# Patient Record
Sex: MALE | Employment: FULL TIME | URBAN - METROPOLITAN AREA
[De-identification: names, ages, dates, MRNs, and addresses within clinical notes are randomized per-mention and may not be internally consistent; named-entity substitution may affect disease eponyms.]

---

## 2018-04-23 ENCOUNTER — HOSPITAL ENCOUNTER (OUTPATIENT)
Dept: NON INVASIVE DIAGNOSTICS | Age: 58
Discharge: HOME | End: 2018-04-23
Attending: FAMILY MEDICINE
Payer: COMMERCIAL

## 2018-04-23 DIAGNOSIS — R42 DIZZINESS: ICD-10-CM

## 2018-04-23 DIAGNOSIS — R07.89 TIGHTNESS IN CHEST: ICD-10-CM

## 2018-04-23 DIAGNOSIS — I10 HYPERTENSION, ESSENTIAL: ICD-10-CM

## 2018-04-23 DIAGNOSIS — E78.5 HYPERLIPEMIA: ICD-10-CM

## 2018-04-23 LAB
ATTENDING PHYSICIAN, CST07: NORMAL
DIAGNOSIS, 93000: NORMAL
DUKE TM SCORE RESULT, CST14: NORMAL
DUKE TREADMILL SCORE, CST13: NORMAL
ECG INTERP BEFORE EX, CST11: NORMAL
ECG INTERP DURING EX, CST12: NORMAL
FUNCTIONAL CAPACITY, CST17: NORMAL
KNOWN CARDIAC CONDITION, CST08: NORMAL
MAX. DIASTOLIC BP, CST04: 58 MMHG
MAX. HEART RATE, CST05: 146 BPM
MAX. SYSTOLIC BP, CST03: 170 MMHG
OVERALL BP RESPONSE TO EXERCISE, CST16: NORMAL
OVERALL HR RESPONSE TO EXERCISE, CST15: NORMAL
PEAK EX METS, CST10: 8.2 METS
PROTOCOL NAME, CST01: NORMAL
TEST INDICATION, CST09: NORMAL

## 2018-04-23 PROCEDURE — 93017 CV STRESS TEST TRACING ONLY: CPT

## 2018-04-24 NOTE — PROCEDURES
[address]  MR#: [id number]  : 1960  ACCOUNT #: [de-identified]   DATE OF SERVICE: 2018    ORDERING PHYSICIAN:  Earnestine Rojas MD    INTERPRETING PHYSICIAN:  Dipak Marquez MD     INDICATIONS FOR STUDY:  Chest pain. BASELINE DATA:  Baseline EKG reveals normal sinus rhythm with a heart rate of 79 beats per minute. Resting blood pressure was 128/82 mmHg. Following this, patient exercised on treadmill using standard Gavino protocol. The patient exercised for a total of 6 minutes 49 seconds, achieving a work level of 8.20 METs. Heart rate increased to a maximum of 146 beats per minute, which represents 90% of maximal age predicted heart rate. Maximum blood pressure was 170/58 mmHg. Test was stopped due to fatigue. INDICATIONS:  Ischemic ST-T changes were noted. No ventricular arrhythmias were noted. The patient did not report chest pain. INTERPRETATION:  1. Negative EKG portion for ischemia. 2.  Good functional capacity. 3.  Appropriate heart rate and blood pressure response.     Thank you for this referral.      MD Tyra Ellis / [facility]  D: 2018 08:35     T: 2018 12:02  JOB #: [id number]

## 2021-02-05 ENCOUNTER — APPOINTMENT (OUTPATIENT)
Dept: GENERAL RADIOLOGY | Age: 61
End: 2021-02-05
Attending: PHYSICIAN ASSISTANT
Payer: COMMERCIAL

## 2021-02-05 ENCOUNTER — HOSPITAL ENCOUNTER (EMERGENCY)
Age: 61
Discharge: HOME | End: 2021-02-05
Attending: EMERGENCY MEDICINE
Payer: COMMERCIAL

## 2021-02-05 VITALS
OXYGEN SATURATION: 99 % | BODY MASS INDEX: 29.35 KG/M2 | DIASTOLIC BLOOD PRESSURE: 92 MMHG | HEIGHT: 67 IN | TEMPERATURE: 98.1 F | RESPIRATION RATE: 17 BRPM | SYSTOLIC BLOOD PRESSURE: 162 MMHG | WEIGHT: 187 LBS | HEART RATE: 82 BPM

## 2021-02-05 DIAGNOSIS — L03.113 CELLULITIS OF RIGHT UPPER EXTREMITY: ICD-10-CM

## 2021-02-05 DIAGNOSIS — L02.91 ABSCESS: Primary | ICD-10-CM

## 2021-02-05 PROCEDURE — 75810000289 HC I&D ABSCESS SIMP/COMP/MULT

## 2021-02-05 PROCEDURE — 99283 EMERGENCY DEPT VISIT LOW MDM: CPT

## 2021-02-05 PROCEDURE — 73130 X-RAY EXAM OF HAND: CPT

## 2021-02-05 PROCEDURE — 74011250637 HC RX REV CODE- 250/637: Performed by: PHYSICIAN ASSISTANT

## 2021-02-05 RX ORDER — CLINDAMYCIN HYDROCHLORIDE 150 MG/1
300 CAPSULE ORAL
Status: COMPLETED | OUTPATIENT
Start: 2021-02-05 | End: 2021-02-05

## 2021-02-05 RX ORDER — OXYCODONE AND ACETAMINOPHEN 5; 325 MG/1; MG/1
1 TABLET ORAL
Qty: 12 TAB | Refills: 0 | Status: SHIPPED | OUTPATIENT
Start: 2021-02-05 | End: 2021-02-08

## 2021-02-05 RX ORDER — CLINDAMYCIN HYDROCHLORIDE 300 MG/1
300 CAPSULE ORAL 4 TIMES DAILY
Qty: 28 CAP | Refills: 0 | Status: SHIPPED | OUTPATIENT
Start: 2021-02-05 | End: 2021-02-12

## 2021-02-05 RX ORDER — OXYCODONE AND ACETAMINOPHEN 5; 325 MG/1; MG/1
1 TABLET ORAL
Status: COMPLETED | OUTPATIENT
Start: 2021-02-05 | End: 2021-02-05

## 2021-02-05 RX ADMIN — CLINDAMYCIN HYDROCHLORIDE 300 MG: 150 CAPSULE ORAL at 20:15

## 2021-02-05 RX ADMIN — OXYCODONE AND ACETAMINOPHEN 1 TABLET: 5; 325 TABLET ORAL at 20:59

## 2021-02-05 NOTE — ED TRIAGE NOTES
Patient presents to ED with c/o right wrist abscess that appears to look like cellulitis x2days. Patient shares that the pain is described as tight and sharp. No drainage. VSS.

## 2021-02-06 NOTE — DISCHARGE INSTRUCTIONS
[facility] Activation    Thank you for requesting access to [facility]. Please follow the instructions below to securely access and download your online medical record. [facility] [employer] allows you to send messages to your doctor, view your test results, renew your prescriptions, schedule appointments, and more. How Do I Sign Up? In your internet browser, go to [email or web address]  Click on the First Time User? Click Here link in the Sign In box. You will be redirect to the New Member Sign Up page. Enter your [facility] Access Code exactly as it appears below. You will not need to use this code after youve completed the sign-up process. If you do not sign up before the expiration date, you must request a new code. [facility] Access Code: [unfilled] (This is the date your [facility] access code will )    Enter the last four digits of your Social Security Number (xxxx) and Date of Birth (mm/dd/yyyy) as indicated and click Submit. You will be taken to the next sign-up page. Create a [facility] ID. This will be your [facility] login ID and cannot be changed, so think of one that is secure and easy to remember. Create a [facility] [employer] password. You can change your password at any time. Enter your Password Reset Question and Answer. This can be used at a later time if you forget your password. Enter your e-mail address. You will receive e-mail notification when new information is available in [address] Click Sign Up. You can now view and download portions of your medical record. Click the [place] link to download a portable copy of your medical information. Additional Information    If you have questions, please visit the Frequently Asked Questions section of the [employer] website at [email or web address] Remember, [facility] is NOT to be used for urgent needs. For medical emergencies, dial 911.

## 2021-02-06 NOTE — ED PROVIDER NOTES
EMERGENCY DEPARTMENT HISTORY AND PHYSICAL EXAM    Date: 2/5/2021  Patient Name: Timothy Castro    History of Presenting Illness     Chief Complaint   Patient presents with    Skin Problem    Skin Infection         History Provided By: patient     Chief Complaint: abscess  Duration: 2 days  Timing: acute  Location: R hand   Quality:throbbing   Severity: moderate  Modifying Factors: none   Associated Symptoms: redness/wound      Additional History (Context): Timothy Castro is a 56 y.o. male with PMH htn who presents with c/o an abscess to the R hand that started a small \"pimple\" yesterday and increasing in size and redness significantly today. Pt reports a hx of RLE cellulitis requiring hospitalization, and expresses concerns regarding this skin infection. He denies fever, chills, and drainage from the site. Pt is right handed. No other complaints reported at this time. PCP: Alen Rivers MD    Current Facility-Administered Medications   Medication Dose Route Frequency Provider Last Rate Last Admin    oxyCODONE-acetaminophen (PERCOCET) 5-325 mg per tablet 1 Tab  1 Tab Oral NOW Felicitas Toussaint PA-C         Current Outpatient Medications   Medication Sig Dispense Refill    oxyCODONE-acetaminophen (Percocet) 5-325 mg per tablet Take 1 Tab by mouth every six (6) hours as needed for Pain for up to 3 days. Max Daily Amount: 4 Tabs. 12 Tab 0    clindamycin (CLEOCIN) 300 mg capsule Take 1 Cap by mouth four (4) times daily for 7 days. 28 Cap 0    omega 3-dha-epa-fish oil (Fish Oil) 100-160-1,000 mg cap Take  by mouth.  aspirin delayed-release 81 mg tablet Take  by mouth daily.  omeprazole (PRILOSEC) 20 mg capsule Take  by mouth.  atorvastatin (LIPITOR) 40 mg tablet Take  by mouth.  losartan (COZAAR) 100 mg tablet Take 100 mg by mouth daily.  amLODIPine (NORVASC) 5 mg tablet Take 5 mg by mouth daily.            Past History     Past Medical History:  Past Medical History: Diagnosis Date    Hypertension        Past Surgical History:  No past surgical history on file. Family History:  No family history on file. Social History:  Social History     Tobacco Use    Smoking status: Never Smoker    Smokeless tobacco: Never Used   Substance Use Topics    Alcohol use: Yes     Comment: socially    Drug use: No       Allergies:  No Known Allergies      Review of Systems   Review of Systems   Constitutional: Negative. Negative for chills and fever. HENT: Negative. Negative for congestion, ear pain and rhinorrhea. Eyes: Negative. Negative for pain and redness. Respiratory: Negative. Negative for cough, shortness of breath, wheezing and stridor. Cardiovascular: Negative. Negative for chest pain and leg swelling. Gastrointestinal: Negative. Negative for abdominal pain, constipation, diarrhea, nausea and vomiting. Genitourinary: Negative. Negative for dysuria and frequency. Musculoskeletal: Negative. Negative for back pain and neck pain. Skin: Positive for color change and wound. Neurological: Negative. Negative for dizziness, seizures, syncope and headaches. All other systems reviewed and are negative. All Other Systems Negative  Physical Exam     Vitals:    02/05/21 1846   BP: (!) 162/92   Pulse: 82   Resp: 17   Temp: 98.1 °F (36.7 °C)   SpO2: 99%   Weight: 84.8 kg (187 lb)   Height: 5' 7\" (1.702 m)     Physical Exam  Vitals signs and nursing note reviewed. Constitutional:       General: He is not in acute distress. Appearance: He is well-developed. He is not diaphoretic. HENT:      Head: Normocephalic and atraumatic. Eyes:      General: No scleral icterus. Right eye: No discharge. Left eye: No discharge. Conjunctiva/sclera: Conjunctivae normal.   Neck:      Musculoskeletal: Normal range of motion and neck supple. Cardiovascular:      Rate and Rhythm: Normal rate.    Pulmonary:      Effort: Pulmonary effort is normal. No respiratory distress. Breath sounds: No stridor. Musculoskeletal: Normal range of motion. Comments: RUE: intact pulses, cap RF < 3 sec, no bony TTP or deformity noted. FROM of all joints intact. Skin:     General: Skin is warm and dry. Comments: 3 cm indurated abscess to the dorsum of the R hand, over the area of the 5th metatarsal. No palpable fluctuance noted on exam.    Neurological:      Mental Status: He is alert and oriented to person, place, and time. Coordination: Coordination normal.      Comments: Gait is steady and patient exhibits no evidence of ataxia. Patient is able to ambulate without difficulty. No focal neurological deficit noted. No facial droop, slurred speech, or evidence of altered mentation noted on exam.       Psychiatric:         Behavior: Behavior normal.         Thought Content: Thought content normal.                Diagnostic Study Results     Labs -   No results found for this or any previous visit (from the past 12 hour(s)). Radiologic Studies -   XR HAND RT MIN 3 V    (Results Pending)     CT Results  (Last 48 hours)    None        CXR Results  (Last 48 hours)    None            Medical Decision Making   I am the first provider for this patient. I reviewed the vital signs, available nursing notes, past medical history, past surgical history, family history and social history. Vital Signs-Reviewed the patient's vital signs.       Records Reviewed: Felicitas Toussaint PA-C     Procedures:  I&D Abcess Simple    Date/Time: 2/5/2021 9:06 PM  Performed by: Yeimi Barillas  Authorized by: Yeimi Barillas     Consent:     Consent obtained:  Verbal    Consent given by:  Patient    Risks discussed:  Bleeding, incomplete drainage, infection and pain    Alternatives discussed:  No treatment  Location:     Type:  Abscess    Size:  3 cm     Location:  Upper extremity    Upper extremity location:  Hand    Hand location:  R hand  Pre-procedure details: Skin preparation:  Betadine  Procedure type:     Complexity:  Simple  Procedure details:     Needle aspiration: no      Incision types:  Single straight    Incision depth:  Dermal    Scalpel blade:  11    Wound management:  Irrigated with saline    Drainage:  Purulent and bloody    Drainage amount:  Scant    Wound treatment:  Wound left open    Packing materials:  None  Post-procedure details:     Patient tolerance of procedure: Tolerated well, no immediate complications        Provider Notes (Medical Decision Making): Impression:  Abscess. Cellulitis    X-rays negative for evidence of OM   Clindamycin given orally in the ED    Bedside US performed shows some cobblestone appearance as well as a small drainable fluid collected, discussed the options of I and D now vs waiting, pt requesting I and D now. I and D performed, small amount of blood and purulence noted. Will plan to d/c with abx and pain control with pcp follow-up on Monday for wound re-evaluation. Return precautions given. Pt agrees with this plan. Felicitas Toussaint PA-C         MED RECONCILIATION:  Current Facility-Administered Medications   Medication Dose Route Frequency    oxyCODONE-acetaminophen (PERCOCET) 5-325 mg per tablet 1 Tab  1 Tab Oral NOW     Current Outpatient Medications   Medication Sig    oxyCODONE-acetaminophen (Percocet) 5-325 mg per tablet Take 1 Tab by mouth every six (6) hours as needed for Pain for up to 3 days. Max Daily Amount: 4 Tabs.  clindamycin (CLEOCIN) 300 mg capsule Take 1 Cap by mouth four (4) times daily for 7 days.  omega 3-dha-epa-fish oil (Fish Oil) 100-160-1,000 mg cap Take  by mouth.  aspirin delayed-release 81 mg tablet Take  by mouth daily.  omeprazole (PRILOSEC) 20 mg capsule Take  by mouth.  atorvastatin (LIPITOR) 40 mg tablet Take  by mouth.  losartan (COZAAR) 100 mg tablet Take 100 mg by mouth daily.  amLODIPine (NORVASC) 5 mg tablet Take 5 mg by mouth daily. Disposition:  D/c    DISCHARGE NOTE:   Patient is stable for discharge at this time. I have discussed all the findings from today's work up with the patient, including lab results and imaging. I have answered all questions. Rx for clindamycin and percocet given. Rest and close follow-up with the PCP recommended this week. Return to the ED immediately for any new or worsening symptoms. Felicitas Rosas PA-C     Follow-up Information     Follow up With Specialties Details Why Contact Info    Alen Rivers MD Family Medicine In 2 days For suture removal [address] [place] [address] [phone] [id number] [facility] EMERGENCY DEPT Emergency Medicine  As needed, If symptoms worsen [address] [facility] [address] [phone]          Current Discharge Medication List      START taking these medications    Details   oxyCODONE-acetaminophen (Percocet) 5-325 mg per tablet Take 1 Tab by mouth every six (6) hours as needed for Pain for up to 3 days. Max Daily Amount: 4 Tabs. Qty: 12 Tab, Refills: 0    Associated Diagnoses: Abscess; Cellulitis of right upper extremity      clindamycin (CLEOCIN) 300 mg capsule Take 1 Cap by mouth four (4) times daily for 7 days. Qty: 28 Cap, Refills: 0                 Diagnosis     Clinical Impression:   1. Abscess    2.  Cellulitis of right upper extremity

## 2021-02-06 NOTE — ED NOTES
Discharge paperwork given to patient by provider and teaching completed. Did no see nor assess patient. Medicated and patient waiting for uber for ride home.  Stable at dispo

## 2021-04-08 ENCOUNTER — HOSPITAL ENCOUNTER (EMERGENCY)
Age: 61
Discharge: HOME | DRG: 853 | End: 2021-04-08
Attending: EMERGENCY MEDICINE
Payer: COMMERCIAL

## 2021-04-08 ENCOUNTER — APPOINTMENT (OUTPATIENT)
Dept: CT IMAGING | Age: 61
DRG: 853 | End: 2021-04-08
Attending: PHYSICIAN ASSISTANT
Payer: COMMERCIAL

## 2021-04-08 VITALS
SYSTOLIC BLOOD PRESSURE: 158 MMHG | TEMPERATURE: 98.8 F | DIASTOLIC BLOOD PRESSURE: 95 MMHG | BODY MASS INDEX: 29.82 KG/M2 | OXYGEN SATURATION: 97 % | WEIGHT: 190 LBS | RESPIRATION RATE: 18 BRPM | HEIGHT: 67 IN | HEART RATE: 97 BPM

## 2021-04-08 DIAGNOSIS — L03.221 CELLULITIS OF NECK: Primary | ICD-10-CM

## 2021-04-08 LAB
ALBUMIN SERPL-MCNC: 3.4 G/DL (ref 3.4–5)
ALBUMIN/GLOB SERPL: 0.8 {RATIO} (ref 0.8–1.7)
ALP SERPL-CCNC: 76 U/L (ref 45–117)
ALT SERPL-CCNC: 35 U/L (ref 16–61)
ANION GAP SERPL CALC-SCNC: 5 MMOL/L (ref 3–18)
AST SERPL-CCNC: 16 U/L (ref 10–38)
BASOPHILS # BLD: 0 K/UL (ref 0–0.1)
BASOPHILS NFR BLD: 0 % (ref 0–2)
BILIRUB SERPL-MCNC: 0.6 MG/DL (ref 0.2–1)
BUN SERPL-MCNC: 21 MG/DL (ref 7–18)
BUN/CREAT SERPL: 19 (ref 12–20)
CALCIUM SERPL-MCNC: 8.7 MG/DL (ref 8.5–10.1)
CHLORIDE SERPL-SCNC: 106 MMOL/L (ref 100–111)
CO2 SERPL-SCNC: 28 MMOL/L (ref 21–32)
CREAT SERPL-MCNC: 1.11 MG/DL (ref 0.6–1.3)
DIFFERENTIAL METHOD BLD: ABNORMAL
EOSINOPHIL # BLD: 0 K/UL (ref 0–0.4)
EOSINOPHIL NFR BLD: 0 % (ref 0–5)
ERYTHROCYTE [DISTWIDTH] IN BLOOD BY AUTOMATED COUNT: 13.8 % (ref 11.6–14.5)
GLOBULIN SER CALC-MCNC: 4.3 G/DL (ref 2–4)
GLUCOSE SERPL-MCNC: 121 MG/DL (ref 74–99)
HCT VFR BLD AUTO: 44.1 % (ref 36–48)
HGB BLD-MCNC: 15.1 G/DL (ref 13–16)
LACTATE BLD-SCNC: 0.51 MMOL/L (ref 0.4–2)
LYMPHOCYTES # BLD: 1.5 K/UL (ref 0.9–3.6)
LYMPHOCYTES NFR BLD: 8 % (ref 21–52)
MCH RBC QN AUTO: 30.4 PG (ref 24–34)
MCHC RBC AUTO-ENTMCNC: 34.2 G/DL (ref 31–37)
MCV RBC AUTO: 88.7 FL (ref 74–97)
MONOCYTES # BLD: 0.6 K/UL (ref 0.05–1.2)
MONOCYTES NFR BLD: 3 % (ref 3–10)
NEUTS SEG # BLD: 16.6 K/UL (ref 1.8–8)
NEUTS SEG NFR BLD: 89 % (ref 40–73)
PLATELET # BLD AUTO: 206 K/UL (ref 135–420)
PLATELET COMMENTS,PCOM: ABNORMAL
PMV BLD AUTO: 10.2 FL (ref 9.2–11.8)
POTASSIUM SERPL-SCNC: 3.1 MMOL/L (ref 3.5–5.5)
PROT SERPL-MCNC: 7.7 G/DL (ref 6.4–8.2)
RBC # BLD AUTO: 4.97 M/UL (ref 4.35–5.65)
RBC MORPH BLD: ABNORMAL
SODIUM SERPL-SCNC: 139 MMOL/L (ref 136–145)
WBC # BLD AUTO: 18.7 K/UL (ref 4.6–13.2)

## 2021-04-08 PROCEDURE — 80053 COMPREHEN METABOLIC PANEL: CPT

## 2021-04-08 PROCEDURE — 99283 EMERGENCY DEPT VISIT LOW MDM: CPT

## 2021-04-08 PROCEDURE — 74011000250 HC RX REV CODE- 250: Performed by: PHYSICIAN ASSISTANT

## 2021-04-08 PROCEDURE — 85025 COMPLETE CBC W/AUTO DIFF WBC: CPT

## 2021-04-08 PROCEDURE — 70491 CT SOFT TISSUE NECK W/DYE: CPT

## 2021-04-08 PROCEDURE — 83605 ASSAY OF LACTIC ACID: CPT

## 2021-04-08 PROCEDURE — 74011000258 HC RX REV CODE- 258: Performed by: PHYSICIAN ASSISTANT

## 2021-04-08 PROCEDURE — 96374 THER/PROPH/DIAG INJ IV PUSH: CPT

## 2021-04-08 PROCEDURE — 74011000636 HC RX REV CODE- 636: Performed by: EMERGENCY MEDICINE

## 2021-04-08 PROCEDURE — 74011250637 HC RX REV CODE- 250/637: Performed by: PHYSICIAN ASSISTANT

## 2021-04-08 RX ORDER — HYDROCODONE BITARTRATE AND ACETAMINOPHEN 5; 325 MG/1; MG/1
1 TABLET ORAL
Qty: 14 TAB | Refills: 0 | Status: SHIPPED | OUTPATIENT
Start: 2021-04-08 | End: 2021-04-16

## 2021-04-08 RX ORDER — CLINDAMYCIN HYDROCHLORIDE 300 MG/1
300 CAPSULE ORAL 4 TIMES DAILY
Qty: 28 CAP | Refills: 0 | Status: SHIPPED | OUTPATIENT
Start: 2021-04-08 | End: 2021-04-16

## 2021-04-08 RX ORDER — POTASSIUM CHLORIDE 20 MEQ/1
40 TABLET, EXTENDED RELEASE ORAL
Status: COMPLETED | OUTPATIENT
Start: 2021-04-08 | End: 2021-04-08

## 2021-04-08 RX ORDER — HYDROCODONE BITARTRATE AND ACETAMINOPHEN 5; 325 MG/1; MG/1
1 TABLET ORAL
Status: COMPLETED | OUTPATIENT
Start: 2021-04-08 | End: 2021-04-08

## 2021-04-08 RX ADMIN — POTASSIUM CHLORIDE 40 MEQ: 1500 TABLET, EXTENDED RELEASE ORAL at 23:02

## 2021-04-08 RX ADMIN — CLINDAMYCIN PHOSPHATE 600 MG: 150 INJECTION, SOLUTION INTRAVENOUS at 23:02

## 2021-04-08 RX ADMIN — IOPAMIDOL 80 ML: 612 INJECTION, SOLUTION INTRAVENOUS at 21:45

## 2021-04-08 RX ADMIN — HYDROCODONE BITARTRATE AND ACETAMINOPHEN 1 TABLET: 5; 325 TABLET ORAL at 23:02

## 2021-04-08 NOTE — ED TRIAGE NOTES
Patient presents to the ED with complaints of neck pain x 1 week. Patient states he has some knots on the back of his neck. Patient states he has a history of cellulitis.

## 2021-04-09 NOTE — DISCHARGE INSTRUCTIONS
[employer] Activation    Thank you for requesting access to [employer]. Please follow the instructions below to securely access and download your online medical record. [employer] allows you to send messages to your doctor, view your test results, renew your prescriptions, schedule appointments, and more. How Do I Sign Up? In your internet browser, go to [email or web address]  Click on the First Time User? Click Here link in the Sign In box. You will be redirect to the New Member Sign Up page. Enter your [employer] Access Code exactly as it appears below. You will not need to use this code after youve completed the sign-up process. If you do not sign up before the expiration date, you must request a new code. [employer] Access Code: [unfilled] (This is the date your [employer] access code will )    Enter the last four digits of your Social Security Number (xxxx) and Date of Birth (mm/dd/yyyy) as indicated and click Submit. You will be taken to the next sign-up page. Create a [employer] ID. This will be your [employer] login ID and cannot be changed, so think of one that is secure and easy to remember. Create a [employer] password. You can change your password at any time. Enter your Password Reset Question and Answer. This can be used at a later time if you forget your password. Enter your e-mail address. You will receive e-mail notification when new information is available in [address] Click Sign Up. You can now view and download portions of your medical record. Click the [place] link to download a portable copy of your medical information. Additional Information    If you have questions, please visit the Frequently Asked Questions section of the [employer] website at [email or web address] Remember, [employer] is NOT to be used for urgent needs. For medical emergencies, dial 911.

## 2021-04-09 NOTE — ED PROVIDER NOTES
EMERGENCY DEPARTMENT HISTORY AND PHYSICAL EXAM    Date: 4/8/2021  Patient Name: Richard Sharif    History of Presenting Illness     Chief Complaint   Patient presents with    Neck Pain         History Provided By: Patient    Chief Complaint: neck pain   Duration: 2 Days  Timing:  Gradual  Location: back of neck   Quality: Stabbing and Pressure  Severity: Moderate  Modifying Factors: none   Associated Symptoms: denies any other associated signs or symptoms      Additional History (Context): Richard Sharif is a 56 y.o. male with PMH of cellulitis and HTN who presents with pain and possible abscess on the back of his neck. Patient states that he noticed a lesion on the back of his neck 3 days ago and ever since he has been experiencing worsening pain, warmth, and has noticed redness over the area. He still has FROM of his neck. He denies fever and chills. No issues on the front of his neck. Stats that he has had cellulitis 3x in the past and he is concerned for a possible infection. No nausea, vomiting, or recent illness. PCP: Rosalie Coello MD    Current Facility-Administered Medications   Medication Dose Route Frequency Provider Last Rate Last Admin    clindamycin phosphate (CLEOCIN) 600 mg in 0.9% sodium chloride (MBP/ADV) 100 mL MBP  600 mg IntraVENous NOW Felicitas Toussaint PA-C   600 mg at 04/08/21 2302     Current Outpatient Medications   Medication Sig Dispense Refill    clindamycin (CLEOCIN) 300 mg capsule Take 1 Cap by mouth four (4) times daily for 7 days. 28 Cap 0    HYDROcodone-acetaminophen (NORCO) 5-325 mg per tablet Take 1 Tab by mouth every six (6) hours as needed for Pain for up to 7 days. Max Daily Amount: 4 Tabs. 14 Tab 0    omega 3-dha-epa-fish oil (Fish Oil) 100-160-1,000 mg cap Take  by mouth.  aspirin delayed-release 81 mg tablet Take  by mouth daily.  omeprazole (PRILOSEC) 20 mg capsule Take  by mouth.  atorvastatin (LIPITOR) 40 mg tablet Take  by mouth.  losartan (COZAAR) 100 mg tablet Take 100 mg by mouth daily.  amLODIPine (NORVASC) 5 mg tablet Take 5 mg by mouth daily. Past History     Past Medical History:  Past Medical History:   Diagnosis Date    Hypertension        Past Surgical History:  No past surgical history on file. Family History:  No family history on file. Social History:  Social History     Tobacco Use    Smoking status: Never Smoker    Smokeless tobacco: Never Used   Substance Use Topics    Alcohol use: Yes     Comment: socially    Drug use: No       Allergies:  No Known Allergies      Review of Systems   Review of Systems   Constitutional: Negative for chills and fever. HENT: Negative for congestion, sneezing and sore throat. Respiratory: Negative for cough and shortness of breath. Cardiovascular: Negative for chest pain. Gastrointestinal: Negative for abdominal pain, diarrhea, nausea and vomiting. Genitourinary: Negative for dysuria, frequency and urgency. Musculoskeletal: Positive for neck pain. Negative for arthralgias, back pain and neck stiffness. Skin: Positive for rash. Neurological: Negative for dizziness, light-headedness and headaches. Psychiatric/Behavioral: Negative. All other systems reviewed and are negative. All Other Systems Negative  Physical Exam     Vitals:    04/08/21 1936   BP: (!) 158/95   Pulse: 97   Resp: 18   Temp: 98.8 °F (37.1 °C)   SpO2: 97%   Weight: 86.2 kg (190 lb)   Height: 5' 7\" (1.702 m)     Physical Exam  Vitals signs and nursing note reviewed. Constitutional:       Appearance: Normal appearance. He is normal weight. HENT:      Head: Normocephalic and atraumatic. Nose: Nose normal. No rhinorrhea. Mouth/Throat:      Mouth: Mucous membranes are moist.      Pharynx: Oropharynx is clear. Eyes:      Extraocular Movements: Extraocular movements intact.       Conjunctiva/sclera: Conjunctivae normal.      Pupils: Pupils are equal, round, and reactive to light. Neck:      Musculoskeletal: Normal range of motion. Muscular tenderness (area of tenderness over the back neck ) present. No neck rigidity. Cardiovascular:      Rate and Rhythm: Normal rate and regular rhythm. Pulses: Normal pulses. Heart sounds: Normal heart sounds. No murmur. No friction rub. No gallop. Pulmonary:      Effort: Pulmonary effort is normal.      Breath sounds: Normal breath sounds. Abdominal:      General: Abdomen is flat. Palpations: Abdomen is soft. Musculoskeletal: Normal range of motion. General: No swelling or tenderness. Skin:     General: Skin is warm and dry. Findings: Erythema and rash (6x3cm indurated area of erythema and warmth over back of neck with 2 possible central pores w/ small amount of purulence noted. ) present. Neurological:      General: No focal deficit present. Mental Status: He is alert and oriented to person, place, and time. Mental status is at baseline. Comments: Gait is steady and patient exhibits no evidence of ataxia. Patient is able to ambulate without difficulty. No focal neurological deficit noted.  No facial droop, slurred speech, or evidence of altered mentation noted on exam.     Psychiatric:         Mood and Affect: Mood normal.         Behavior: Behavior normal.         Judgment: Judgment normal.                Diagnostic Study Results     Labs -     Recent Results (from the past 12 hour(s))   CBC WITH AUTOMATED DIFF    Collection Time: 04/08/21  7:44 PM   Result Value Ref Range    WBC 18.7 (H) 4.6 - 13.2 K/uL    RBC 4.97 4.35 - 5.65 M/uL    HGB 15.1 13.0 - 16.0 g/dL    HCT 44.1 36.0 - 48.0 %    MCV 88.7 74.0 - 97.0 FL    MCH 30.4 24.0 - 34.0 PG    MCHC 34.2 31.0 - 37.0 g/dL    RDW 13.8 11.6 - 14.5 %    PLATELET 343 461 - 240 K/uL    MPV 10.2 9.2 - 11.8 FL    NEUTROPHILS 89 (H) 40 - 73 %    LYMPHOCYTES 8 (L) 21 - 52 %    MONOCYTES 3 3 - 10 %    EOSINOPHILS 0 0 - 5 %    BASOPHILS 0 0 - 2 % ABS. NEUTROPHILS 16.6 (H) 1.8 - 8.0 K/UL    ABS. LYMPHOCYTES 1.5 0.9 - 3.6 K/UL    ABS. MONOCYTES 0.6 0.05 - 1.2 K/UL    ABS. EOSINOPHILS 0.0 0.0 - 0.4 K/UL    ABS. BASOPHILS 0.0 0.0 - 0.1 K/UL    DF MANUAL      PLATELET COMMENTS ADEQUATE PLATELETS      RBC COMMENTS NORMOCYTIC, NORMOCHROMIC     METABOLIC PANEL, COMPREHENSIVE    Collection Time: 04/08/21  7:44 PM   Result Value Ref Range    Sodium 139 136 - 145 mmol/L    Potassium 3.1 (L) 3.5 - 5.5 mmol/L    Chloride 106 100 - 111 mmol/L    CO2 28 21 - 32 mmol/L    Anion gap 5 3.0 - 18 mmol/L    Glucose 121 (H) 74 - 99 mg/dL    BUN 21 (H) 7.0 - 18 MG/DL    Creatinine 1.11 0.6 - 1.3 MG/DL    BUN/Creatinine ratio 19 12 - 20      GFR est AA >60 >60 ml/min/1.73m2    GFR est non-AA >60 >60 ml/min/1.73m2    Calcium 8.7 8.5 - 10.1 MG/DL    Bilirubin, total 0.6 0.2 - 1.0 MG/DL    ALT (SGPT) 35 16 - 61 U/L    AST (SGOT) 16 10 - 38 U/L    Alk. phosphatase 76 45 - 117 U/L    Protein, total 7.7 6.4 - 8.2 g/dL    Albumin 3.4 3.4 - 5.0 g/dL    Globulin 4.3 (H) 2.0 - 4.0 g/dL    A-G Ratio 0.8 0.8 - 1.7     POC LACTIC ACID    Collection Time: 04/08/21  7:52 PM   Result Value Ref Range    Lactic Acid (POC) 0.51 0.40 - 2.00 mmol/L       Radiologic Studies -   CT NECK SOFT TISSUE W CONT   Final Result   1. Soft tissue swelling throughout the cutaneous and subcutaneous posterior neck   may represent cellulitis, without abscess. 2. Left mandibular first molar tooth dental and periodontal disease. 3. Borderline mediastinal AP window lymphadenopathy. Consider complete CT chest   follow-up in 3 months for stability. CT Results  (Last 48 hours)               04/08/21 2151  CT NECK SOFT TISSUE W CONT Final result    Impression:  1. Soft tissue swelling throughout the cutaneous and subcutaneous posterior neck   may represent cellulitis, without abscess. 2. Left mandibular first molar tooth dental and periodontal disease. 3. Borderline mediastinal AP window lymphadenopathy. Consider complete CT chest   follow-up in 3 months for stability. Narrative:  Neck CT With Contrast       CLINICAL INDICATION: Neck pain for one week. Redness and swelling mass posterior   upper neck below the base of skull, increasing. COMPARISON: None. TECHNIQUE: Axial enhanced images of the neck. 80 cc Isovue-300 nonionic IV   contrast.  All CT scans are performed using dose optimization techniques as   appropriate to the performed exam including the following: Automated exposure   control, adjustment of mA and/or kV according to patient size, and use of   iterative reconstructive technique. FINDINGS:    Subcutaneous soft tissue swelling with skin thickening throughout the posterior   neck, greatest throughout the upper neck. No rim enhanced fluid collection. Cervical spine or calvarial osseous findings. Degenerative disc space narrowing   with small osteophytes throughout the mid and lower cervical spine C4-C7 levels. Incidental thoracic vertebral hemangiomas. The sinuses are clear. Dental cavity and mild periapical abscess/erosions   involving the left lower first molar tooth without periodontal soft tissue   abnormalities. Orbits and partially included face of brain are within normal   limits. Parotid glands, submandibular glands, and thyroid gland are   unremarkable. Subcentimeter cervical lymph nodes. Pharynx and larynx mucosa is   unremarkable. While not closely evaluated, the major vessels of the neck are   grossly patent. Lung apex is clear. Mildly enlarged 1 cm AP window lymph nodes in the   mediastinum. CXR Results  (Last 48 hours)    None            Medical Decision Making   I am the first provider for this patient. I reviewed the vital signs, available nursing notes, past medical history, past surgical history, family history and social history. Vital Signs-Reviewed the patient's vital signs.         Records Reviewed: Felicitas Toussaint MUNA     Procedures:  Procedures    Provider Notes (Medical Decision Making): Impression:  Cellulitis of neck    IV inserted, IV fluids and clindamycin ordered  Labs: WBC 18.7, normal lactic acid, K 3.1, otherwise unremarkable    CT consistent with cellulitis. Will plan to d/c with abx and pain control. pcp follow-up. Pt agrees. Felicitas Toussaint PA-C     MED RECONCILIATION:  Current Facility-Administered Medications   Medication Dose Route Frequency    clindamycin phosphate (CLEOCIN) 600 mg in 0.9% sodium chloride (MBP/ADV) 100 mL MBP  600 mg IntraVENous NOW     Current Outpatient Medications   Medication Sig    clindamycin (CLEOCIN) 300 mg capsule Take 1 Cap by mouth four (4) times daily for 7 days.  HYDROcodone-acetaminophen (NORCO) 5-325 mg per tablet Take 1 Tab by mouth every six (6) hours as needed for Pain for up to 7 days. Max Daily Amount: 4 Tabs.  omega 3-dha-epa-fish oil (Fish Oil) 100-160-1,000 mg cap Take  by mouth.  aspirin delayed-release 81 mg tablet Take  by mouth daily.  omeprazole (PRILOSEC) 20 mg capsule Take  by mouth.  atorvastatin (LIPITOR) 40 mg tablet Take  by mouth.  losartan (COZAAR) 100 mg tablet Take 100 mg by mouth daily.  amLODIPine (NORVASC) 5 mg tablet Take 5 mg by mouth daily. Disposition:  D/c    DISCHARGE NOTE:   Patient is stable for discharge at this time. I have discussed all the findings from today's work up with the patient, including lab results and imaging. I have answered all questions. Rx for clindamycin and norco given. Rest and close follow-up with the PCP recommended this week. Return to the ED immediately for any new or worsening symptoms. Felicitas Toussaint PA-C     Follow-up Information    None         Current Discharge Medication List      START taking these medications    Details   clindamycin (CLEOCIN) 300 mg capsule Take 1 Cap by mouth four (4) times daily for 7 days.   Qty: 28 Cap, Refills: 0      HYDROcodone-acetaminophen ([address]) 5-325 mg per tablet Take 1 Tab by mouth every six (6) hours as needed for Pain for up to 7 days. Max Daily Amount: 4 Tabs. Qty: 14 Tab, Refills: 0    Associated Diagnoses: Cellulitis of neck                   Diagnosis     Clinical Impression:   1.  Cellulitis of neck

## 2021-04-11 ENCOUNTER — HOSPITAL ENCOUNTER (INPATIENT)
Age: 61
LOS: 5 days | Discharge: HOME HEALTH | DRG: 853 | End: 2021-04-16
Attending: STUDENT IN AN ORGANIZED HEALTH CARE EDUCATION/TRAINING PROGRAM | Admitting: FAMILY MEDICINE
Payer: COMMERCIAL

## 2021-04-11 DIAGNOSIS — A41.9 SEPSIS DUE TO CELLULITIS (HCC): Primary | ICD-10-CM

## 2021-04-11 DIAGNOSIS — L03.221 CELLULITIS OF NECK: ICD-10-CM

## 2021-04-11 DIAGNOSIS — L03.90 SEPSIS DUE TO CELLULITIS (HCC): Primary | ICD-10-CM

## 2021-04-11 LAB
ANION GAP SERPL CALC-SCNC: 7 MMOL/L (ref 3–18)
BASOPHILS # BLD: 0 K/UL (ref 0–0.1)
BASOPHILS NFR BLD: 0 % (ref 0–2)
BUN SERPL-MCNC: 13 MG/DL (ref 7–18)
BUN/CREAT SERPL: 13 (ref 12–20)
CALCIUM SERPL-MCNC: 8.7 MG/DL (ref 8.5–10.1)
CHLORIDE SERPL-SCNC: 101 MMOL/L (ref 100–111)
CO2 SERPL-SCNC: 30 MMOL/L (ref 21–32)
CREAT SERPL-MCNC: 1.03 MG/DL (ref 0.6–1.3)
DIFFERENTIAL METHOD BLD: ABNORMAL
EOSINOPHIL # BLD: 0 K/UL (ref 0–0.4)
EOSINOPHIL NFR BLD: 0 % (ref 0–5)
ERYTHROCYTE [DISTWIDTH] IN BLOOD BY AUTOMATED COUNT: 13.7 % (ref 11.6–14.5)
GLUCOSE SERPL-MCNC: 107 MG/DL (ref 74–99)
HCT VFR BLD AUTO: 44.3 % (ref 36–48)
HGB BLD-MCNC: 15 G/DL (ref 13–16)
LACTATE BLD-SCNC: 1.02 MMOL/L (ref 0.4–2)
LYMPHOCYTES # BLD: 1.4 K/UL (ref 0.9–3.6)
LYMPHOCYTES NFR BLD: 5 % (ref 21–52)
MCH RBC QN AUTO: 29.8 PG (ref 24–34)
MCHC RBC AUTO-ENTMCNC: 33.9 G/DL (ref 31–37)
MCV RBC AUTO: 88.1 FL (ref 74–97)
MONOCYTES # BLD: 2.5 K/UL (ref 0.05–1.2)
MONOCYTES NFR BLD: 9 % (ref 3–10)
NEUTS SEG # BLD: 24.4 K/UL (ref 1.8–8)
NEUTS SEG NFR BLD: 86 % (ref 40–73)
PLATELET # BLD AUTO: 239 K/UL (ref 135–420)
PLATELET COMMENTS,PCOM: ABNORMAL
PMV BLD AUTO: 9.4 FL (ref 9.2–11.8)
POTASSIUM SERPL-SCNC: 3.3 MMOL/L (ref 3.5–5.5)
RBC # BLD AUTO: 5.03 M/UL (ref 4.35–5.65)
RBC MORPH BLD: ABNORMAL
SODIUM SERPL-SCNC: 138 MMOL/L (ref 136–145)
WBC # BLD AUTO: 28.3 K/UL (ref 4.6–13.2)

## 2021-04-11 PROCEDURE — 74011250637 HC RX REV CODE- 250/637: Performed by: STUDENT IN AN ORGANIZED HEALTH CARE EDUCATION/TRAINING PROGRAM

## 2021-04-11 PROCEDURE — 85025 COMPLETE CBC W/AUTO DIFF WBC: CPT

## 2021-04-11 PROCEDURE — 87205 SMEAR GRAM STAIN: CPT

## 2021-04-11 PROCEDURE — 96360 HYDRATION IV INFUSION INIT: CPT

## 2021-04-11 PROCEDURE — 87077 CULTURE AEROBIC IDENTIFY: CPT

## 2021-04-11 PROCEDURE — 99283 EMERGENCY DEPT VISIT LOW MDM: CPT

## 2021-04-11 PROCEDURE — 74011250637 HC RX REV CODE- 250/637: Performed by: FAMILY MEDICINE

## 2021-04-11 PROCEDURE — 74011250636 HC RX REV CODE- 250/636: Performed by: STUDENT IN AN ORGANIZED HEALTH CARE EDUCATION/TRAINING PROGRAM

## 2021-04-11 PROCEDURE — 83605 ASSAY OF LACTIC ACID: CPT

## 2021-04-11 PROCEDURE — 65270000029 HC RM PRIVATE

## 2021-04-11 PROCEDURE — 80048 BASIC METABOLIC PNL TOTAL CA: CPT

## 2021-04-11 PROCEDURE — 74011250636 HC RX REV CODE- 250/636: Performed by: FAMILY MEDICINE

## 2021-04-11 PROCEDURE — 2709999900 HC NON-CHARGEABLE SUPPLY

## 2021-04-11 PROCEDURE — 87040 BLOOD CULTURE FOR BACTERIA: CPT

## 2021-04-11 PROCEDURE — 87186 SC STD MICRODIL/AGAR DIL: CPT

## 2021-04-11 RX ORDER — AMLODIPINE BESYLATE 10 MG/1
10 TABLET ORAL DAILY
Status: DISCONTINUED | OUTPATIENT
Start: 2021-04-12 | End: 2021-04-16 | Stop reason: HOSPADM

## 2021-04-11 RX ORDER — POTASSIUM CHLORIDE 20 MEQ/1
40 TABLET, EXTENDED RELEASE ORAL
Status: COMPLETED | OUTPATIENT
Start: 2021-04-11 | End: 2021-04-11

## 2021-04-11 RX ORDER — ENOXAPARIN SODIUM 100 MG/ML
40 INJECTION SUBCUTANEOUS DAILY
Status: DISCONTINUED | OUTPATIENT
Start: 2021-04-12 | End: 2021-04-16 | Stop reason: HOSPADM

## 2021-04-11 RX ORDER — HYDROCODONE BITARTRATE AND ACETAMINOPHEN 5; 325 MG/1; MG/1
1 TABLET ORAL
Status: DISCONTINUED | OUTPATIENT
Start: 2021-04-11 | End: 2021-04-16 | Stop reason: HOSPADM

## 2021-04-11 RX ORDER — IBUPROFEN 600 MG/1
600 TABLET ORAL
Status: COMPLETED | OUTPATIENT
Start: 2021-04-11 | End: 2021-04-11

## 2021-04-11 RX ORDER — VANCOMYCIN 1.75 GRAM/500 ML IN 0.9 % SODIUM CHLORIDE INTRAVENOUS
1750 ONCE
Status: COMPLETED | OUTPATIENT
Start: 2021-04-11 | End: 2021-04-11

## 2021-04-11 RX ORDER — PROMETHAZINE HYDROCHLORIDE 12.5 MG/1
12.5 TABLET ORAL
Status: DISCONTINUED | OUTPATIENT
Start: 2021-04-11 | End: 2021-04-16 | Stop reason: HOSPADM

## 2021-04-11 RX ORDER — ASPIRIN 81 MG/1
81 TABLET ORAL DAILY
Status: DISCONTINUED | OUTPATIENT
Start: 2021-04-12 | End: 2021-04-16 | Stop reason: HOSPADM

## 2021-04-11 RX ORDER — POLYETHYLENE GLYCOL 3350 17 G/17G
17 POWDER, FOR SOLUTION ORAL DAILY PRN
Status: DISCONTINUED | OUTPATIENT
Start: 2021-04-11 | End: 2021-04-16 | Stop reason: HOSPADM

## 2021-04-11 RX ORDER — ACETAMINOPHEN 500 MG
1000 TABLET ORAL
Status: COMPLETED | OUTPATIENT
Start: 2021-04-11 | End: 2021-04-11

## 2021-04-11 RX ORDER — IBUPROFEN 600 MG/1
600 TABLET ORAL
Status: DISCONTINUED | OUTPATIENT
Start: 2021-04-11 | End: 2021-04-16 | Stop reason: HOSPADM

## 2021-04-11 RX ORDER — ACETAMINOPHEN 650 MG/1
650 SUPPOSITORY RECTAL
Status: DISCONTINUED | OUTPATIENT
Start: 2021-04-11 | End: 2021-04-16 | Stop reason: HOSPADM

## 2021-04-11 RX ORDER — ACETAMINOPHEN 325 MG/1
650 TABLET ORAL
Status: DISCONTINUED | OUTPATIENT
Start: 2021-04-11 | End: 2021-04-16 | Stop reason: HOSPADM

## 2021-04-11 RX ORDER — PANTOPRAZOLE SODIUM 20 MG/1
20 TABLET, DELAYED RELEASE ORAL DAILY
Status: DISCONTINUED | OUTPATIENT
Start: 2021-04-12 | End: 2021-04-16 | Stop reason: HOSPADM

## 2021-04-11 RX ORDER — SODIUM CHLORIDE 0.9 % (FLUSH) 0.9 %
5-40 SYRINGE (ML) INJECTION AS NEEDED
Status: DISCONTINUED | OUTPATIENT
Start: 2021-04-11 | End: 2021-04-16 | Stop reason: HOSPADM

## 2021-04-11 RX ORDER — ATORVASTATIN CALCIUM 10 MG/1
10 TABLET, FILM COATED ORAL
Status: DISCONTINUED | OUTPATIENT
Start: 2021-04-12 | End: 2021-04-16 | Stop reason: HOSPADM

## 2021-04-11 RX ORDER — ATORVASTATIN CALCIUM 10 MG/1
10 TABLET, FILM COATED ORAL
Status: DISCONTINUED | OUTPATIENT
Start: 2021-04-11 | End: 2021-04-11

## 2021-04-11 RX ORDER — LOSARTAN POTASSIUM 50 MG/1
100 TABLET ORAL DAILY
Status: DISCONTINUED | OUTPATIENT
Start: 2021-04-12 | End: 2021-04-16 | Stop reason: HOSPADM

## 2021-04-11 RX ORDER — SODIUM CHLORIDE 0.9 % (FLUSH) 0.9 %
5-40 SYRINGE (ML) INJECTION EVERY 8 HOURS
Status: DISCONTINUED | OUTPATIENT
Start: 2021-04-11 | End: 2021-04-16 | Stop reason: HOSPADM

## 2021-04-11 RX ORDER — ONDANSETRON 2 MG/ML
4 INJECTION INTRAMUSCULAR; INTRAVENOUS
Status: DISCONTINUED | OUTPATIENT
Start: 2021-04-11 | End: 2021-04-16 | Stop reason: HOSPADM

## 2021-04-11 RX ADMIN — Medication 10 ML: at 17:35

## 2021-04-11 RX ADMIN — HYDROCODONE BITARTRATE AND ACETAMINOPHEN 1 TABLET: 5; 325 TABLET ORAL at 22:42

## 2021-04-11 RX ADMIN — IBUPROFEN 600 MG: 600 TABLET, FILM COATED ORAL at 22:42

## 2021-04-11 RX ADMIN — SODIUM CHLORIDE 1000 ML: 900 INJECTION, SOLUTION INTRAVENOUS at 15:18

## 2021-04-11 RX ADMIN — VANCOMYCIN HYDROCHLORIDE 1750 MG: 10 INJECTION, POWDER, LYOPHILIZED, FOR SOLUTION INTRAVENOUS at 17:55

## 2021-04-11 RX ADMIN — IBUPROFEN 600 MG: 600 TABLET ORAL at 15:14

## 2021-04-11 RX ADMIN — ACETAMINOPHEN 1000 MG: 500 TABLET, FILM COATED ORAL at 15:14

## 2021-04-11 RX ADMIN — POTASSIUM CHLORIDE 40 MEQ: 1500 TABLET, EXTENDED RELEASE ORAL at 17:53

## 2021-04-11 RX ADMIN — Medication 10 ML: at 22:45

## 2021-04-11 NOTE — ED PROVIDER NOTES
58-year-old male with past medical history significant for hypertension presents to the ED with complaints of worsening pain and swelling of his posterior neck. Of note he was seen in this ED 3 days ago and diagnosed with cellulitis of the neck and discharged home on clindamycin. He was also given a prescription for Norco.  He has been attempting to do warm compresses regularly throughout the day but notes only mild drainage from the infection site and states the pain medication is no longer working. He describes his discomfort as a constant, sharp sensation and nothing seems to make it better or worse. He denies any fever or chills. He occasionally drinks alcohol but denies smoking or recreational drug use. Family history reviewed and noncontributory. Past Medical History:   Diagnosis Date    Hypertension        History reviewed. No pertinent surgical history. History reviewed. No pertinent family history.     Social History     Socioeconomic History    Marital status: SINGLE     Spouse name: Not on file    Number of children: Not on file    Years of education: Not on file    Highest education level: Not on file   Occupational History    Not on file   Social Needs    Financial resource strain: Not on file    Food insecurity     Worry: Not on file     Inability: Not on file    Transportation needs     Medical: Not on file     Non-medical: Not on file   Tobacco Use    Smoking status: Never Smoker    Smokeless tobacco: Never Used   Substance and Sexual Activity    Alcohol use: Yes     Comment: socially    Drug use: No    Sexual activity: Not on file   Lifestyle    Physical activity     Days per week: Not on file     Minutes per session: Not on file    Stress: Not on file   Relationships    Social connections     Talks on phone: Not on file     Gets together: Not on file     Attends Nondenominational service: Not on file     Active member of club or organization: Not on file     Attends meetings of clubs or organizations: Not on file     Relationship status: Not on file    Intimate partner violence     Fear of current or ex partner: Not on file     Emotionally abused: Not on file     Physically abused: Not on file     Forced sexual activity: Not on file   Other Topics Concern    Not on file   Social History Narrative    Not on file         ALLERGIES: Patient has no known allergies. Review of Systems   Constitutional: Negative for activity change and appetite change. HENT: Negative for drooling and facial swelling. Eyes: Negative for pain and discharge. Respiratory: Negative for apnea and choking. Cardiovascular: Negative for palpitations and leg swelling. Gastrointestinal: Negative for blood in stool and rectal pain. Endocrine: Negative for polydipsia and polyphagia. Genitourinary: Negative for genital sores and hematuria. Musculoskeletal: Positive for neck pain. Negative for gait problem and neck stiffness. Skin: Negative for color change and rash. Allergic/Immunologic: Negative for environmental allergies. Neurological: Negative for tremors. Hematological: Negative for adenopathy. Psychiatric/Behavioral: Negative for agitation and behavioral problems. Vitals:    04/11/21 1419   BP: (!) 146/94   Pulse: (!) 103   Resp: 16   Temp: 99.3 °F (37.4 °C)   SpO2: 98%   Weight: 86.2 kg (190 lb)   Height: 5' 7\" (1.702 m)            Physical Exam  Vitals signs and nursing note reviewed. Constitutional:       Appearance: Normal appearance. HENT:      Head: Normocephalic and atraumatic. Eyes:      Extraocular Movements: Extraocular movements intact. Pupils: Pupils are equal, round, and reactive to light. Cardiovascular:      Rate and Rhythm: Regular rhythm. Tachycardia present. Heart sounds: Normal heart sounds. No murmur. No friction rub. Pulmonary:      Effort: Pulmonary effort is normal.      Breath sounds: Normal breath sounds.    Abdominal: Palpations: Abdomen is soft. Musculoskeletal: Normal range of motion. Skin:     General: Skin is warm and dry. Capillary Refill: Capillary refill takes less than 2 seconds. Comments: Entire posterior neck with cellulitis and tenderness to palpation. In the center there is an approximately 4 cm in diameter area of patchy skin breakdown with crusted purulent discharge. Induration throughout but no fluctuance. Neurological:      General: No focal deficit present. Mental Status: He is alert and oriented to person, place, and time. Psychiatric:         Mood and Affect: Mood normal.          MDM  Number of Diagnoses or Management Options  Sepsis due to cellulitis [facility])  Diagnosis management comments: 68-year-old male diagnosed with posterior neck cellulitis 3 days ago presents with progressive worsening of symptoms. He has been taking his antibiotic and pain medication as prescribed without improvement. He is tachycardic here. Will initiate full laboratory work-up and assess for possible drainable abscess with bedside ultrasound. Pain control with ibuprofen and Tylenol. IV fluid bolus. Will reassess. Laboratory work-up is significant for an elevated white count of 28 which is increased from 18.7 when he was last evaluated here 3 days ago. He is also mildly hypokalemic to 3.3; oral K-Dur ordered. Lactate normal at 1.02. Blood cultures and IV vancomycin ordered. Bedside ultrasound performed by me did not show any obvious drainable fluid collection over the affected area of cellulitis. Case discussed with the [facility] medicine service and patient was accepted under the care of Dr. Lorriane Merlin for sepsis secondary to cellulitis and failure of outpatient treatment with oral antibiotics. CRITICAL CARE:  I have spent 50 minutes of critical care time involved in lab review, consultations with specialist, family decision-making, and documentation.   This time does not include separately billable procedures. During this entire length of time I was immediately available to the patient. Critical Care: The reason for providing this level of medical care for this critically ill patient was due a critical illness that impaired one or more vital organ systems such that there was a high probability of imminent or life threatening deterioration in the patients condition. This care involved high complexity decision making to assess, manipulate, and support vital system functions, to treat this degreee vital organ system failure and to prevent further life threatening deterioration of the patients condition.     Geena Job, DO           Procedures

## 2021-04-11 NOTE — H&P
Admission History and Physical  [facility]      Patient: Damián Valencia MRN: [id number]  CSN: [id number]    YOB: 1960  Age: 56 y.o. Sex: male      DOA: 4/11/2021       HPI:     Damián Valencia is a 56 y.o. male with PMH HTN, HLD, GERD, now presenting with complaint of neck pain and swelling. Patient presented to the [facility] ED 3 days ago and diagnosed with cellulitis of the neck and discharged home on clindamycin. Today, he presents with worsening pain and swelling of his posterior neck. Patient reports that he has taken prescribed clindamycin. His neck pain and swelling started 1-week ago with worsening of symptoms this past Tuesday whereby his cellulitis started to drain pus. He reports chills but no fever; denies nausea or vomiting. The neck pain has associated headaches which is relieved with tylenol and/or ibuprofen. There is also swelling in his right jaw but no reported dental problems or tooth pain. Hx of recurrent cellulitis, he currently has boils in bilateral underarms as well as boils in his groin. A few years ago, his right hand had cellulitis which developed into an abscess that required I&D and abx. Previous cellulitis of right leg, approximately 10-years ago, that required hospital admission for IV antibiotics. 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 ED Course:   Labs: CBC, CMP   Diagnostics/Imaging: CT Neck  Medications: Ibuprofen, tylenol     Review of Systems   Constitutional: Positive for chills. Negative for diaphoresis, fever and malaise/fatigue. HENT: Negative for ear pain and hearing loss. Eyes: Negative for blurred vision. Respiratory: Negative for cough and shortness of breath. Cardiovascular: Negative for chest pain. Gastrointestinal: Negative for abdominal pain, nausea and vomiting. Musculoskeletal: Positive for neck pain. Skin: Positive for rash. Neurological: Positive for headaches. Negative for dizziness and weakness. Past Medical History:   Diagnosis Date    Hypertension        History reviewed. No pertinent surgical history. History reviewed. No pertinent family history. Social History     Socioeconomic History    Marital status: SINGLE     Spouse name: Not on file    Number of children: Not on file    Years of education: Not on file    Highest education level: Not on file   Tobacco Use    Smoking status: Never Smoker    Smokeless tobacco: Never Used   Substance and Sexual Activity    Alcohol use: Yes     Comment: socially    Drug use: No       No Known Allergies    Prior to Admission Medications   Prescriptions Last Dose Informant Patient Reported? Taking? HYDROcodone-acetaminophen (NORCO) 5-325 mg per tablet Not Taking at Unknown time  No No   Sig: Take 1 Tab by mouth every six (6) hours as needed for Pain for up to 7 days. Max Daily Amount: 4 Tabs. amLODIPine (NORVASC) 5 mg tablet 4/11/2021 at Unknown time  Yes Yes   Sig: Take 10 mg by mouth daily. aspirin delayed-release 81 mg tablet 4/11/2021 at Unknown time  Yes Yes   Sig: Take  by mouth daily. atorvastatin (LIPITOR) 40 mg tablet 4/11/2021 at Unknown time  Yes Yes   Sig: Take  by mouth. clindamycin (CLEOCIN) 300 mg capsule 4/11/2021 at Unknown time  No Yes   Sig: Take 1 Cap by mouth four (4) times daily for 7 days. losartan (COZAAR) 100 mg tablet 4/11/2021 at Unknown time  Yes Yes   Sig: Take 100 mg by mouth daily. omega 3-dha-epa-fish oil (Fish Oil) 100-160-1,000 mg cap 4/11/2021 at Unknown time  Yes Yes   Sig: Take  by mouth. omeprazole (PRILOSEC) 20 mg capsule 4/11/2021 at Unknown time  Yes Yes   Sig: Take  by mouth. Facility-Administered Medications: None       Physical Exam:     Patient Vitals for the past 24 hrs:   Temp Pulse Resp BP SpO2   04/11/21 1419 99.3 °F (37.4 °C) (!) 103 16 (!) 146/94 98 %     Physical Exam  Constitutional:       General: He is not in acute distress. Appearance: He is normal weight. He is not ill-appearing, toxic-appearing or diaphoretic. HENT:      Head: Normocephalic and atraumatic. Nose: Nose normal. No congestion or rhinorrhea. Mouth/Throat:      Mouth: Mucous membranes are moist.      Pharynx: Oropharynx is clear. No oropharyngeal exudate or posterior oropharyngeal erythema. Eyes:      General: No scleral icterus. Conjunctiva/sclera: Conjunctivae normal.   Neck:      Musculoskeletal: Normal range of motion. Muscular tenderness present. No neck rigidity. Cardiovascular:      Rate and Rhythm: Normal rate and regular rhythm. Pulses: Normal pulses. Heart sounds: Normal heart sounds. No murmur. No gallop. Pulmonary:      Effort: Pulmonary effort is normal. No respiratory distress. Breath sounds: Normal breath sounds. No wheezing, rhonchi or rales. Abdominal:      General: Abdomen is flat. Bowel sounds are normal. There is no distension. Palpations: Abdomen is soft. Tenderness: There is no abdominal tenderness. There is no guarding or rebound. Musculoskeletal:      Right lower leg: No edema. Left lower leg: No edema. Skin:     General: Skin is warm. Capillary Refill: Capillary refill takes less than 2 seconds. Findings: Erythema and rash present. Comments: - Cellulitis at posterior neck with erythematous area measuring 6x7cm with approximately 4x2cm area of induration and draining pus. Area is warm. - Right mandibular warm, erythematous and mildly swollen. Neurological:      Mental Status: He is alert and oriented to person, place, and time. IMAGING: No results found.     Recent Results (from the past 12 hour(s))   CBC WITH AUTOMATED DIFF    Collection Time: 04/11/21  3:10 PM   Result Value Ref Range    WBC 28.3 (H) 4.6 - 13.2 K/uL    RBC 5.03 4.35 - 5.65 M/uL    HGB 15.0 13.0 - 16.0 g/dL    HCT 44.3 36.0 - 48.0 %    MCV 88.1 74.0 - 97.0 FL    MCH 29.8 24.0 - 34.0 PG    MCHC 33.9 31.0 - 37.0 g/dL    RDW 13.7 11.6 - 14.5 %    PLATELET 199 025 - 057 K/uL    MPV 9.4 9.2 - 11.8 FL    NEUTROPHILS 86 (H) 40 - 73 %    LYMPHOCYTES 5 (L) 21 - 52 %    MONOCYTES 9 3 - 10 %    EOSINOPHILS 0 0 - 5 %    BASOPHILS 0 0 - 2 %    ABS. NEUTROPHILS 24.4 (H) 1.8 - 8.0 K/UL    ABS. LYMPHOCYTES 1.4 0.9 - 3.6 K/UL    ABS. MONOCYTES 2.5 (H) 0.05 - 1.2 K/UL    ABS. EOSINOPHILS 0.0 0.0 - 0.4 K/UL    ABS. BASOPHILS 0.0 0.0 - 0.1 K/UL    DF MANUAL      PLATELET COMMENTS ADEQUATE PLATELETS      RBC COMMENTS NORMOCYTIC, NORMOCHROMIC     METABOLIC PANEL, BASIC    Collection Time: 04/11/21  3:10 PM   Result Value Ref Range    Sodium 138 136 - 145 mmol/L    Potassium 3.3 (L) 3.5 - 5.5 mmol/L    Chloride 101 100 - 111 mmol/L    CO2 30 21 - 32 mmol/L    Anion gap 7 3.0 - 18 mmol/L    Glucose 107 (H) 74 - 99 mg/dL    BUN 13 7.0 - 18 MG/DL    Creatinine 1.03 0.6 - 1.3 MG/DL    BUN/Creatinine ratio 13 12 - 20      GFR est AA >60 >60 ml/min/1.73m2    GFR est non-AA >60 >60 ml/min/1.73m2    Calcium 8.7 8.5 - 10.1 MG/DL   POC LACTIC ACID    Collection Time: 04/11/21  4:54 PM   Result Value Ref Range    Lactic Acid (POC) 1.02 0.40 - 2.00 mmol/L         Assessment/Plan:   56 y.o. male with PMH HTN, HLD and GERD, now admitted with cellulitis of neck. Sepsis 2/2 to cellulitis at neck: Cellulitis at back of neck with outpatient treatment failure. Patient seen in the ED and discharged home with clindamycin with no resolution of symptoms. DDx include cellulitis vs. Abscess. Recurrent cellulitis most likely secondary to Hydradenitis suppurativa. Patient has been afebrile, hypertensive to 146/94 with mild tachycardia to 103. Labs significant for leukocytosis (WBC 28.3) with left shift, lactic acid wnl. Meets 2/4 SIRS criteria (tachycardia and leukocytosis).  CT showed Soft tissue swelling throughout the cutaneous and subcutaneous posterior neck (cellulitis w/o abscess), Left mandibular first molar tooth dental and periodontal disease  - Admit to medicine  - Continue Vancomycin, pharmacy to dose  - FU blood culture  - Pain control - tylenol and ibuprofen prn, consider Norco if pain worsens  - wound care consult, appreciate recs  - warm compress  - Labs: MRSA nasal swab, wound culture  - Daily labs: CBC, BMP  - VSS as per protocol    HTN: BP on admission 146/94  - Continue home Amlodipine 10mg qD and Losartan 10mg qD    Hyperkalemia: K+ 3.3   - replete as per protocol  - repeat BMP    HLD: Continue home Lipitor 10mg qHS and ASA 81mg qD    GERD: Omeprazole 20mg qD at home  - substitute with Protonix 20mg qD    [facility]:  - PT/OT   - case management   Diet Regular   DVT Prophylaxis Lovenox   Code status Full   Disposition  <2nights      Point of Contact Song Rhodes  Relationship:Friend  [phone]    Agueda Cantu  Relationship:Friend  [phone]     Janel Kahn MD, PGY1   [facility] Medicine   Intern Pager: [phone]   April 11, 2021, 5:38 PM         Belinda Swanson MD   Resident   FAMILY MEDICINE   Progress Notes   Shared   Date of Service:  04/11/21 7154                           Senior Addendum to History and Physical  [facility]        Patient: Azalea Steen MRN: [id number]  CSN: [id number]    YOB: 1960  Age: 56 y.o. Sex: male       DOA: 4/11/2021       HPI:      Azalea Steen is a 56 y.o. male with PMH HTN, HLD, GERD, now presenting with complaint of neck rash and swelling.     Patient states he noticed an itchy rash start to develop on his posterior hair line approximately 10 days ago. He then noticed the area becoming more and more swollen and red, and started opening and draining about 5 days ago. His neck pain was so bad 3 days ago (4/8) that he came to the ER for evaluation. ... He had a CT neck at that time that showed soft tissue swelling throughout the cutaneous and subcutaneous posterior neck without abscess.  Despite a leukocytosis of 18, he was afebrile and well appearing- so was discharged home with Clindamycin and Norco for the pain. Patient states that despite the antibiotics, he has not gotten any better, and he actually thinks the area has gotten significantly worse. Has been using hot compresses, but area has not really been draining much at all. Feels a lot of pressure in his neck. Denies any fevers, but does endorse chills and loss of appetite. No nausea or vomiting. Does have hx of 2 episodes of cellulitis in his leg 10 years ago that required IV abx. Has also had a cellulitis that developed into an abscess on his hand a few years ago that required I&D. Patient also endorses frequent boils in his armpits and groin. Currently has 2 inflamed boils in his armpits.       ROS, PMH, PSH, Family Hx, Social Hx, Home medications, and allergies as above in intern H&P. Which has been reviewed in full.      Physical Exam:      Physical Exam:  General:  Alert and Responsive and in No acute distress. HEENT: Conjunctiva pink, sclera anicteric. PERRL. EOMI. Pharynx moist, nonerythematous. No cervical lymphadenopathy. CV:  RRR, no murmurs. RESP:  Unlabored breathing. Lungs clear to auscultation. no wheeze, rales, or rhonchi. Equal expansion bilaterally. ABD:  Soft, nontender, nondistended. MS:  No joint deformity or instability. No atrophy. Neuro:  5/5 strength bilateral upper extremities and lower extremities. CN II-XII intact. Sensation grossly intact. A+Ox3. Ext:  No edema. 2+ radial and dp pulses bilaterally. Skin:  Posterior neck with large indurated area measuring approximately [id number]- opening in the midline with dried up crusty white drainage. No active drainage could be expelled. Indurated, red and warm area extending along almost the whole posterior neck well up into the hairline. Painful to palpation.  2 small erythematous nodules in armpits (1 in each)- no surrounding erythema, no drainage.         Assessment/Plan:   56 y.o. male with PMH HTN, HLD, GERD, now admitted with sepsis 2/2 cellulitis.        Sepsis 2/2 cellulitis of neck- patient presents with worsening neck pain/swelling with redness and seems to have failed outpatient treatment for his cellulitis. WBC initially 18 in the ER 3 days ago, now 28 with a left shift. 2/4 SIRS criteria met (leukocytosis, tachycardic up to 103). Otherwise labs unremarkable, VSS. Neck crusty, erythematous, indurated and red. Right mandibular area also swollen and red. CT of neck 3 days ago showed cellulitis without abscess. ER resident did bedside ultrasound on area and did not see any areas of fluid collection. - admit to medical floor   - s/p 1L NS bolus  - IV vancomycin   - Alternating Tylenol/Ibuprofen for pain relief. Can consider adding Norco if not sufficient (he was discharged with this from the ER 3 days prior  - MRSA nasal swab   - Wound culture   - Blood cultures  - Daily CBC/BMP   - Wound care consult   - Warm compresses, local wound care  - Consider repeat CT neck vs official US to evaluate for abscess  - PT/OT      For full problem list, please see intern note.  Reviewed.     Adam Plummer MD PGY-2  [facility]  4/11/2021, 5:36 PM

## 2021-04-11 NOTE — ED TRIAGE NOTES
The patient presents for evaluation of cellulitis to the back of his neck. States, \"I've been taking my antibiotics, but it's not any better. \"

## 2021-04-11 NOTE — Clinical Note
Status[de-identified] INPATIENT [101]  Type of Bed: Medical [8]  Inpatient Hospitalization Certified Necessary for the Following Reasons: 3.  Patient receiving treatment that can only be provided in an inpatient setting (further clarification in H&P documentation)   Admitting Diagnosis: Sepsis due to cellulitis [facility]) [[id number]]  Admitting Physician: Mitch Huddleston  Attending Physician: Tereso Blackman [[id number]]  Estimated Length of Stay: 2 Midnights  Discharge Plan[de-identified] Home with Office Follow-up

## 2021-04-12 ENCOUNTER — ANESTHESIA EVENT (OUTPATIENT)
Dept: SURGERY | Age: 61
DRG: 853 | End: 2021-04-12
Payer: COMMERCIAL

## 2021-04-12 ENCOUNTER — HOSPITAL ENCOUNTER (INPATIENT)
Dept: CT IMAGING | Age: 61
Discharge: HOME | DRG: 853 | End: 2021-04-12
Attending: FAMILY MEDICINE
Payer: COMMERCIAL

## 2021-04-12 LAB
ANION GAP SERPL CALC-SCNC: 5 MMOL/L (ref 3–18)
ANION GAP SERPL CALC-SCNC: 7 MMOL/L (ref 3–18)
BASOPHILS # BLD: 0 K/UL (ref 0–0.1)
BASOPHILS NFR BLD: 0 % (ref 0–2)
BUN SERPL-MCNC: 14 MG/DL (ref 7–18)
BUN SERPL-MCNC: 15 MG/DL (ref 7–18)
BUN/CREAT SERPL: 16 (ref 12–20)
BUN/CREAT SERPL: 18 (ref 12–20)
CALCIUM SERPL-MCNC: 7.7 MG/DL (ref 8.5–10.1)
CALCIUM SERPL-MCNC: 8.5 MG/DL (ref 8.5–10.1)
CHLORIDE SERPL-SCNC: 105 MMOL/L (ref 100–111)
CHLORIDE SERPL-SCNC: 106 MMOL/L (ref 100–111)
CO2 SERPL-SCNC: 28 MMOL/L (ref 21–32)
CO2 SERPL-SCNC: 29 MMOL/L (ref 21–32)
CREAT SERPL-MCNC: 0.76 MG/DL (ref 0.6–1.3)
CREAT SERPL-MCNC: 0.94 MG/DL (ref 0.6–1.3)
DIFFERENTIAL METHOD BLD: ABNORMAL
EOSINOPHIL # BLD: 0.2 K/UL (ref 0–0.4)
EOSINOPHIL NFR BLD: 1 % (ref 0–5)
ERYTHROCYTE [DISTWIDTH] IN BLOOD BY AUTOMATED COUNT: 13.8 % (ref 11.6–14.5)
GLUCOSE SERPL-MCNC: 117 MG/DL (ref 74–99)
GLUCOSE SERPL-MCNC: 134 MG/DL (ref 74–99)
HCT VFR BLD AUTO: 41.9 % (ref 36–48)
HGB BLD-MCNC: 14.2 G/DL (ref 13–16)
LYMPHOCYTES # BLD: 1 K/UL (ref 0.9–3.6)
LYMPHOCYTES NFR BLD: 4 % (ref 21–52)
MCH RBC QN AUTO: 30.1 PG (ref 24–34)
MCHC RBC AUTO-ENTMCNC: 33.9 G/DL (ref 31–37)
MCV RBC AUTO: 88.8 FL (ref 74–97)
MONOCYTES # BLD: 1 K/UL (ref 0.05–1.2)
MONOCYTES NFR BLD: 4 % (ref 3–10)
NEUTS BAND NFR BLD MANUAL: 6 %
NEUTS SEG # BLD: 22.4 K/UL (ref 1.8–8)
NEUTS SEG NFR BLD: 85 % (ref 40–73)
PLATELET # BLD AUTO: 237 K/UL (ref 135–420)
PLATELET COMMENTS,PCOM: ABNORMAL
PMV BLD AUTO: 10.1 FL (ref 9.2–11.8)
POTASSIUM SERPL-SCNC: 3.4 MMOL/L (ref 3.5–5.5)
POTASSIUM SERPL-SCNC: 3.9 MMOL/L (ref 3.5–5.5)
RBC # BLD AUTO: 4.72 M/UL (ref 4.35–5.65)
RBC MORPH BLD: ABNORMAL
SODIUM SERPL-SCNC: 139 MMOL/L (ref 136–145)
SODIUM SERPL-SCNC: 141 MMOL/L (ref 136–145)
WBC # BLD AUTO: 24.6 K/UL (ref 4.6–13.2)

## 2021-04-12 PROCEDURE — 74011000258 HC RX REV CODE- 258: Performed by: INTERNAL MEDICINE

## 2021-04-12 PROCEDURE — 74011250636 HC RX REV CODE- 250/636: Performed by: NURSE ANESTHETIST, CERTIFIED REGISTERED

## 2021-04-12 PROCEDURE — 70487 CT MAXILLOFACIAL W/DYE: CPT

## 2021-04-12 PROCEDURE — 80048 BASIC METABOLIC PNL TOTAL CA: CPT

## 2021-04-12 PROCEDURE — 74011250637 HC RX REV CODE- 250/637: Performed by: FAMILY MEDICINE

## 2021-04-12 PROCEDURE — 99254 IP/OBS CNSLTJ NEW/EST MOD 60: CPT | Performed by: SURGERY

## 2021-04-12 PROCEDURE — 74011000636 HC RX REV CODE- 636: Performed by: FAMILY MEDICINE

## 2021-04-12 PROCEDURE — 97161 PT EVAL LOW COMPLEX 20 MIN: CPT

## 2021-04-12 PROCEDURE — 74011250637 HC RX REV CODE- 250/637: Performed by: STUDENT IN AN ORGANIZED HEALTH CARE EDUCATION/TRAINING PROGRAM

## 2021-04-12 PROCEDURE — 2709999900 HC NON-CHARGEABLE SUPPLY

## 2021-04-12 PROCEDURE — 85025 COMPLETE CBC W/AUTO DIFF WBC: CPT

## 2021-04-12 PROCEDURE — 97535 SELF CARE MNGMENT TRAINING: CPT

## 2021-04-12 PROCEDURE — 74011250636 HC RX REV CODE- 250/636: Performed by: FAMILY MEDICINE

## 2021-04-12 PROCEDURE — 97165 OT EVAL LOW COMPLEX 30 MIN: CPT

## 2021-04-12 PROCEDURE — 36415 COLL VENOUS BLD VENIPUNCTURE: CPT

## 2021-04-12 PROCEDURE — 65270000029 HC RM PRIVATE

## 2021-04-12 PROCEDURE — 74011250636 HC RX REV CODE- 250/636: Performed by: INTERNAL MEDICINE

## 2021-04-12 RX ORDER — SODIUM CHLORIDE 0.9 % (FLUSH) 0.9 %
5-40 SYRINGE (ML) INJECTION AS NEEDED
Status: DISCONTINUED | OUTPATIENT
Start: 2021-04-12 | End: 2021-04-16 | Stop reason: HOSPADM

## 2021-04-12 RX ORDER — LIDOCAINE HYDROCHLORIDE 10 MG/ML
0.1 INJECTION, SOLUTION EPIDURAL; INFILTRATION; INTRACAUDAL; PERINEURAL AS NEEDED
Status: DISCONTINUED | OUTPATIENT
Start: 2021-04-12 | End: 2021-04-14

## 2021-04-12 RX ORDER — SODIUM CHLORIDE, SODIUM LACTATE, POTASSIUM CHLORIDE, CALCIUM CHLORIDE 600; 310; 30; 20 MG/100ML; MG/100ML; MG/100ML; MG/100ML
25 INJECTION, SOLUTION INTRAVENOUS CONTINUOUS
Status: DISPENSED | OUTPATIENT
Start: 2021-04-12 | End: 2021-04-13

## 2021-04-12 RX ORDER — VANCOMYCIN/0.9 % SOD CHLORIDE 1.5G/250ML
1500 PLASTIC BAG, INJECTION (ML) INTRAVENOUS EVERY 12 HOURS
Status: DISCONTINUED | OUTPATIENT
Start: 2021-04-12 | End: 2021-04-14

## 2021-04-12 RX ORDER — SODIUM CHLORIDE 0.9 % (FLUSH) 0.9 %
5-40 SYRINGE (ML) INJECTION EVERY 8 HOURS
Status: DISCONTINUED | OUTPATIENT
Start: 2021-04-12 | End: 2021-04-16 | Stop reason: HOSPADM

## 2021-04-12 RX ORDER — POTASSIUM CHLORIDE 20 MEQ/1
40 TABLET, EXTENDED RELEASE ORAL EVERY 4 HOURS
Status: COMPLETED | OUTPATIENT
Start: 2021-04-12 | End: 2021-04-12

## 2021-04-12 RX ADMIN — LOSARTAN POTASSIUM 100 MG: 50 TABLET, FILM COATED ORAL at 08:37

## 2021-04-12 RX ADMIN — VANCOMYCIN HYDROCHLORIDE 1000 MG: 1 INJECTION, POWDER, LYOPHILIZED, FOR SOLUTION INTRAVENOUS at 05:05

## 2021-04-12 RX ADMIN — HYDROCODONE BITARTRATE AND ACETAMINOPHEN 1 TABLET: 5; 325 TABLET ORAL at 12:43

## 2021-04-12 RX ADMIN — VANCOMYCIN HYDROCHLORIDE 1500 MG: 10 INJECTION, POWDER, LYOPHILIZED, FOR SOLUTION INTRAVENOUS at 18:30

## 2021-04-12 RX ADMIN — PANTOPRAZOLE SODIUM 20 MG: 20 TABLET, DELAYED RELEASE ORAL at 08:37

## 2021-04-12 RX ADMIN — SODIUM CHLORIDE 3 G: 900 INJECTION INTRAVENOUS at 17:34

## 2021-04-12 RX ADMIN — AMLODIPINE BESYLATE 10 MG: 10 TABLET ORAL at 08:38

## 2021-04-12 RX ADMIN — IBUPROFEN 600 MG: 600 TABLET, FILM COATED ORAL at 11:36

## 2021-04-12 RX ADMIN — Medication 10 ML: at 05:05

## 2021-04-12 RX ADMIN — SODIUM CHLORIDE, SODIUM LACTATE, POTASSIUM CHLORIDE, AND CALCIUM CHLORIDE 25 ML/HR: 600; 310; 30; 20 INJECTION, SOLUTION INTRAVENOUS at 21:09

## 2021-04-12 RX ADMIN — Medication 10 ML: at 21:10

## 2021-04-12 RX ADMIN — HYDROCODONE BITARTRATE AND ACETAMINOPHEN 1 TABLET: 5; 325 TABLET ORAL at 02:25

## 2021-04-12 RX ADMIN — SODIUM CHLORIDE 3 G: 900 INJECTION INTRAVENOUS at 11:37

## 2021-04-12 RX ADMIN — Medication 81 MG: at 08:38

## 2021-04-12 RX ADMIN — POTASSIUM CHLORIDE 40 MEQ: 1500 TABLET, EXTENDED RELEASE ORAL at 11:36

## 2021-04-12 RX ADMIN — POTASSIUM CHLORIDE 40 MEQ: 1500 TABLET, EXTENDED RELEASE ORAL at 08:37

## 2021-04-12 RX ADMIN — Medication 10 ML: at 17:38

## 2021-04-12 RX ADMIN — IBUPROFEN 600 MG: 600 TABLET, FILM COATED ORAL at 21:41

## 2021-04-12 RX ADMIN — IOPAMIDOL 100 ML: 612 INJECTION, SOLUTION INTRAVENOUS at 16:30

## 2021-04-12 RX ADMIN — HYDROCODONE BITARTRATE AND ACETAMINOPHEN 1 TABLET: 5; 325 TABLET ORAL at 08:37

## 2021-04-12 RX ADMIN — HYDROCODONE BITARTRATE AND ACETAMINOPHEN 1 TABLET: 5; 325 TABLET ORAL at 17:34

## 2021-04-12 RX ADMIN — ENOXAPARIN SODIUM 40 MG: 40 INJECTION SUBCUTANEOUS at 08:38

## 2021-04-12 RX ADMIN — ATORVASTATIN CALCIUM 10 MG: 10 TABLET, FILM COATED ORAL at 21:24

## 2021-04-12 NOTE — ROUTINE PROCESS
End of Shift Note Bedside and verbal shift change report given to uQan Ahumada RN (On coming nurse) by Jack Puga RN (Off going nurse). Report included the following information:  
   --Procedure Summary 
   --MAR, 
   --Recent Results --Med Rec Status SBAR Recommendations: Surg? Issues for Provider to address Activity This Shift 
 
 [] Bed Rest Order 
 [] Refused 
 [] Dangled  
 [] TDWB Ambulating: 
   [x] Bathroom [] BSC [] Room/Hallway Up in Chair for meals []Yes [] No  
Voiding       [x] Yes  [] No 
Graves          [] Yes  [] No 
Incontinent [] Yes  [] No 
 
DUE TO VOID POUR        [] Yes [] No 
Purewick    [] Yes [] No 
New Onset [] Yes [] No Straight Cath   []Yes  [] No 
Condom Cath  [] Yes [] No 
MD Called      [] Yes  [] No  
Blood Sugars Managed []Yes [x] No   
Bowels Moved [] Yes [x] No 
 
Incontinent     [] Yes [] No Passed Gas [x]Yes [] No 
 
New Onset  []Yes [] No 
  
 
 MD Called []Yes  [] No 
  
CHG Bath Done Before Surgery After Surgery  
  
[] Yes  [x] No 
[] Yes  [x] No   
  
Drain Removed [] Yes  [] No [x] N/A Dressing Changed [] Yes   [] No [x] N/A Nausea/Vomiting [] Yes   [x] No    
Ice Packs Changed [] Yes   [] No  [x] N/A Incentive Spirometer  [] Yes  [x] No     
SCD Pumps On Ankle Pumping  [] Yes   [x] No  
  
[] Yes   [x] No    
  
Telemetry Monitoring [] Yes   [x] No   Rhythm

## 2021-04-12 NOTE — ED NOTES
Pt provided with urinal.
TRANSFER - OUT REPORT:    Verbal report given to Precious Cope RN (name) on Azalea Nurse  being transferred to [address] (unit) for routine progression of care       Report consisted of patients Situation, Background, Assessment and   Recommendations(SBAR). Information from the following report(s) SBAR, Kardex, ED Summary, [facility] and Recent Results was reviewed with the receiving nurse. Lines:   Peripheral IV 04/11/21 Left Antecubital (Active)        Opportunity for questions and clarification was provided.       Patient transported with:   Registered Nurse
TRANSFER - OUT REPORT:   Giving report for off going RN Kevin Marcano report given to Rcikie RN(name) on Thereasa Quale  being transferred to [address] [place] [address](unit) for routine progression of care       Report consisted of patients Situation, Background, Assessment and   Recommendations(SBAR). Information from the following report(s) SBAR, Kardex, ED Summary, Intake/Output, MAR, Recent Results and Med Rec Status was reviewed with the receiving nurse. Lines:   Peripheral IV 04/11/21 Left Antecubital (Active)        Opportunity for questions and clarification was provided.       Patient transported with:   [employer]
normal...

## 2021-04-12 NOTE — CONSULTS
Infectious Disease Consultation Note        Reason: neck cellulitis, concern for dental infection    Current abx Prior abx   Vancomycin since 4/11/2021  clindamycin on 4/8/2021     Lines:       Assessment :    56 y.o. male with PMH HTN, HLD, GERD presented to [facility] on 4/11/21 with complaint of neck pain and swelling. History of recurrent abscesses- right wrist, right leg in the past    Clinical presentation c/w posterior neck cellulitis/abscess likely secondary to gram positive bacteria such as streptococcus, staphylococcus    Wound cultures 4/11-pending    Persistent pain and tenderness despite above antibiotics likely due to undrained infection    Left mandibular first molar tooth dental and periodontal disease: No evidence of dental abscess noted on today's exam.    Right axillary abscess-spontaneously drained last week. No evidence of persistent induration on today's exam    Recommendations:    1. Continue vancomycin  2. Obtain surgery consult for I&D posterior neck  3. Follow-up results of CT maxillofacial  4. Follow-up results of wound culture and modify antibiotics as needed    Thank you for consultation request. Above plan was discussed in details with patient, dr. Tracie Odonnell and dr Tristen Graff. Please call me if any further questions or concerns. Will continue to participate in the care of this patient. HPI:    56 y.o. male with PMH HTN, HLD, GERD presented to [facility] on 4/11/21 with complaint of neck pain and swelling.     Patient presented to the [facility] ED 3 days ago and diagnosed with cellulitis of the neck and discharged home on clindamycin. Patient didn't feel any better despite taking the clindamycin. He presented to ed on 4/11/21 with worsening pain and swelling of his posterior neck. He was started on intravenous vancomycin. Patient has been noted to have increasing pain in the neck. There is also concerned that he has a swelling in the right jaw there is concern of dental infection.   CT head and neck has been ordered. I have been consulted for further recommendations.     Hx of recurrent cellulitis, he currently has boils in bilateral underarms as well as boils in his groin. A few years ago, his right hand had cellulitis which developed into an abscess that required I&D and abx. Previous cellulitis of right leg, approximately 10-years ago, that required hospital admission for IV antibiotics. Patient also recollects having a boil in his right armpit last week which drained spontaneously. Patient is not sure if he has had MRSA infection before or not. Past Medical History:   Diagnosis Date    Hypertension        History reviewed. No pertinent surgical history. home Medication List    Details   clindamycin (CLEOCIN) 300 mg capsule Take 1 Cap by mouth four (4) times daily for 7 days. Qty: 28 Cap, Refills: 0      omega 3-dha-epa-fish oil (Fish Oil) 100-160-1,000 mg cap Take  by mouth. aspirin delayed-release 81 mg tablet Take  by mouth daily. omeprazole (PRILOSEC) 20 mg capsule Take  by mouth. atorvastatin (LIPITOR) 40 mg tablet Take  by mouth.      losartan (COZAAR) 100 mg tablet Take 100 mg by mouth daily. amLODIPine (NORVASC) 5 mg tablet Take 10 mg by mouth daily. HYDROcodone-acetaminophen (NORCO) 5-325 mg per tablet Take 1 Tab by mouth every six (6) hours as needed for Pain for up to 7 days. Max Daily Amount: 4 Tabs.   Qty: 14 Tab, Refills: 0    Associated Diagnoses: Cellulitis of neck             Current Facility-Administered Medications   Medication Dose Route Frequency    potassium chloride (K-DUR, KLOR-CON) SR tablet 40 mEq  40 mEq Oral Q4H    vancomycin (VANCOCIN) 1500 mg in  ml infusion  1,500 mg IntraVENous Q12H    amLODIPine (NORVASC) tablet 10 mg  10 mg Oral DAILY    aspirin delayed-release tablet 81 mg  81 mg Oral DAILY    losartan (COZAAR) tablet 100 mg  100 mg Oral DAILY    pantoprazole (PROTONIX) tablet 20 mg  20 mg Oral DAILY    sodium chloride (NS) flush 5-40 mL  5-40 mL IntraVENous Q8H    sodium chloride (NS) flush 5-40 mL  5-40 mL IntraVENous PRN    acetaminophen (TYLENOL) tablet 650 mg  650 mg Oral Q6H PRN    Or    acetaminophen (TYLENOL) suppository 650 mg  650 mg Rectal Q6H PRN    polyethylene glycol (MIRALAX) packet 17 g  17 g Oral DAILY PRN    promethazine (PHENERGAN) tablet 12.5 mg  12.5 mg Oral Q6H PRN    Or    ondansetron (ZOFRAN) injection 4 mg  4 mg IntraVENous Q6H PRN    enoxaparin (LOVENOX) injection 40 mg  40 mg SubCUTAneous DAILY    atorvastatin (LIPITOR) tablet 10 mg  10 mg Oral QHS    ibuprofen (MOTRIN) tablet 600 mg  600 mg Oral Q6H PRN    HYDROcodone-acetaminophen (NORCO) 5-325 mg per tablet 1 Tab  1 Tab Oral Q4H PRN       Allergies: Patient has no known allergies. History reviewed. No pertinent family history.   Social History     Socioeconomic History    Marital status: SINGLE     Spouse name: Not on file    Number of children: Not on file    Years of education: Not on file    Highest education level: Not on file   Occupational History    Not on file   Social Needs    Financial resource strain: Not on file    Food insecurity     Worry: Not on file     Inability: Not on file    Transportation needs     Medical: Not on file     Non-medical: Not on file   Tobacco Use    Smoking status: Never Smoker    Smokeless tobacco: Never Used   Substance and Sexual Activity    Alcohol use: Yes     Comment: socially    Drug use: No    Sexual activity: Not on file   Lifestyle    Physical activity     Days per week: Not on file     Minutes per session: Not on file    Stress: Not on file   Relationships    Social connections     Talks on phone: Not on file     Gets together: Not on file     Attends [employer] service: Not on file     Active member of club or organization: Not on file     Attends meetings of clubs or organizations: Not on file     Relationship status: Not on file    Intimate partner violence     Fear of current or ex partner: Not on file     Emotionally abused: Not on file     Physically abused: Not on file     Forced sexual activity: Not on file   Other Topics Concern    Not on file   Social History Narrative    Not on file     Social History     Tobacco Use   Smoking Status Never Smoker   Smokeless Tobacco Never Used        Temp (24hrs), Av.9 °F (37.2 °C), Min:96.4 °F (35.8 °C), Max:99.9 °F (37.7 °C)    Visit Vitals  BP (!) 144/89   Pulse 88   Temp (!) 96.4 °F (35.8 °C)   Resp 18   Ht 5' 7\" (1.702 m)   Wt 86.2 kg (190 lb)   SpO2 95%   BMI 29.76 kg/m²       ROS: 12 point ROS obtained in details. Pertinent positives as mentioned in HPI,   otherwise negative    Physical Exam:    General: Well developed, well nourished male sitting on the  bed AAOx3 in no acute distress. General:   awake alert and oriented   HEENT:  Normocephalic, atraumatic,  EOMI, no scleral icterus or pallor; no conjunctival hemmohage;  nasal and oral mucous are moist and without evidence of lesions. No thrush. Filled dental cavities. No gingivitis. Neck supple, no bruits. Lymph Nodes:   no cervical, axillary or inguinal adenopathy   Lungs:   non-labored, bilateral chest movements equal   Heart:  RRR, s1 and s2; no rubs or gallops, no edema   Abdomen:  soft, non-distended, active bowel sounds, no hepatomegaly, no splenomegaly. Non-tender   Genitourinary:  deferred   Extremities:   no clubbing, cyanosis; no joint effusions or swelling; Full ROM of all large joints to the upper and lower extremities; muscle mass appropriate for age   Neurologic:  No gross focal sensory abnormalities; 5/5 muscle strength to upper and lower extremities. Speech appropriate.  Cranial nerves intact                        Skin:   Erythema and induration posterior aspect of neck with multiple purulent blisters, significant overlying warmth and tenderness   Back:  no spinal or paraspinal muscle tenderness or rigidity, no CVA tenderness     Psychiatric:  No suicidal or homicidal ideations, appropriate mood and affect         Labs: Results:   Chemistry Recent Labs     04/12/21  0145 04/11/21  1510   * 107*    138   K 3.4* 3.3*    101   CO2 28 30   BUN 14 13   CREA 0.76 1.03   CA 7.7* 8.7   AGAP 7 7   BUCR 18 13      CBC w/Diff Recent Labs     04/12/21  0145 04/11/21  1510   WBC 24.6* 28.3*   RBC 4.72 5.03   HGB 14.2 15.0   HCT 41.9 44.3    239   GRANS 85* 86*   LYMPH 4* 5*   EOS 1 0      Microbiology Recent Labs     04/11/21  1642 04/11/21  1630   CULT NO GROWTH AFTER 13 HOURS NO GROWTH AFTER 13 HOURS          RADIOLOGY:    All available imaging studies/reports in [facility] for this admission were reviewed      Disclaimer: Sections of this note are dictated utilizing voice recognition software, which may have resulted in some phonetic based errors in grammar and contents. Even though attempts were made to correct all the mistakes, some may have been missed, and remained in the body of the document. If questions arise, please contact our department.     Dr. Nemo Eason, Infectious Disease Specialist  [phone]  April 12, 2021  10:01 AM

## 2021-04-12 NOTE — PROGRESS NOTES
Paged physician to notify him of patient's continued pain and tightness in neck. Dr. Douglas Ceballos to give Motrin.

## 2021-04-12 NOTE — PROGRESS NOTES
[facility]  Progress Note    Patient: Sheryle Smart MRN: [id number]   SSN: [id number]  YOB: 1960   Age: 56 y.o. Sex: male      Admit Date: 4/11/2021    LOS: 1 day   Chief Complaint   Patient presents with    Skin Infection       Subjective:     Overnight, patient complained of pain and headache; Norco given and added as PRN for pain control. This AM, patient complains of continued pain at posterior neck. Swelling at posterior neck appears similar to admission, however, swelling in right face/mandible appears to have progressed. ID consulted this AM, recommended CT maxillofacial with contrast to assess. Review of Systems   HENT: Negative for ear pain and hearing loss. Eyes: Negative for blurred vision. Respiratory: Negative for cough, hemoptysis, shortness of breath and wheezing. Cardiovascular: Negative for chest pain, palpitations and leg swelling. Gastrointestinal: Negative for abdominal pain, nausea and vomiting. Musculoskeletal: Positive for neck pain. Neurological: Positive for headaches. Negative for dizziness. Objective:     Patient Vitals for the past 24 hrs:   Temp Pulse Resp BP SpO2   04/12/21 0813 (!) 96.4 °F (35.8 °C) 88 18 (!) 144/89 95 %   04/12/21 0344 99.9 °F (37.7 °C) (!) 105 21 (!) 145/78 98 %   04/11/21 2200 99.9 °F (37.7 °C) (!) 107 20 (!) 163/98 97 %   04/11/21 2115  94 18 (!) 159/95 97 %   04/11/21 2100  96 (!) 36 (!) 152/93 99 %   04/11/21 2030  90  (!) 149/92 96 %   04/11/21 2000  88  136/84 97 %   04/11/21 1945  84  (!) 140/81 97 %   04/11/21 1930  83 17 129/84 97 %   04/11/21 1915  86 23 136/83 97 %   04/11/21 1900  89 17 (!) 145/86 97 %   04/11/21 1845  87 16 129/82 98 %   04/11/21 1830  84 15 138/88 97 %   04/11/21 1815  84 15 132/84 96 %   04/11/21 1419 99.3 °F (37.4 °C) (!) 103 16 (!) 146/94 98 %     Intake and Output:  Current Shift: No intake/output data recorded.   Last three shifts: 04/10 1901 - 04/12 0700  In: 480 [P.O.:480]  Out: 600 [Urine:600]    Physical Exam  Constitutional:       General: He is not in acute distress. Appearance: He is normal weight. He is not ill-appearing, toxic-appearing or diaphoretic. Eyes:      General: No scleral icterus. Conjunctiva/sclera: Conjunctivae normal.   Neck:      Musculoskeletal: Normal range of motion. Muscular tenderness present. Cardiovascular:      Rate and Rhythm: Normal rate and regular rhythm. Pulses: Normal pulses. Heart sounds: Normal heart sounds. No murmur. No friction rub. No gallop. Pulmonary:      Effort: Pulmonary effort is normal. No respiratory distress. Breath sounds: Normal breath sounds. No wheezing, rhonchi or rales. Abdominal:      General: Abdomen is flat. Bowel sounds are normal. There is no distension. Palpations: Abdomen is soft. Tenderness: There is no abdominal tenderness. There is no guarding or rebound. Musculoskeletal:         General: No swelling. Right lower leg: No edema. Left lower leg: No edema. Skin:     General: Skin is warm. Capillary Refill: Capillary refill takes less than 2 seconds. Findings: Lesion and rash present. Comments: Cellulitis at posterior neck with erythematous area measuring 6x7cm with approximately 4x2cm area of induration and draining pus. Area is warm. - Right mandibular warm, erythematous and mildly swollen, swelling now extended below mandible   Neurological:      Mental Status: He is oriented to person, place, and time. Motor: No weakness.        Lab/Data Review:  Recent Results (from the past 12 hour(s))   METABOLIC PANEL, BASIC    Collection Time: 04/12/21  1:45 AM   Result Value Ref Range    Sodium 141 136 - 145 mmol/L    Potassium 3.4 (L) 3.5 - 5.5 mmol/L    Chloride 106 100 - 111 mmol/L    CO2 28 21 - 32 mmol/L    Anion gap 7 3.0 - 18 mmol/L    Glucose 134 (H) 74 - 99 mg/dL    BUN 14 7.0 - 18 MG/DL    Creatinine 0.76 0.6 - 1.3 MG/DL BUN/Creatinine ratio 18 12 - 20      GFR est AA >60 >60 ml/min/1.73m2    GFR est non-AA >60 >60 ml/min/1.73m2    Calcium 7.7 (L) 8.5 - 10.1 MG/DL   CBC WITH AUTOMATED DIFF    Collection Time: 04/12/21  1:45 AM   Result Value Ref Range    WBC 24.6 (H) 4.6 - 13.2 K/uL    RBC 4.72 4.35 - 5.65 M/uL    HGB 14.2 13.0 - 16.0 g/dL    HCT 41.9 36.0 - 48.0 %    MCV 88.8 74.0 - 97.0 FL    MCH 30.1 24.0 - 34.0 PG    MCHC 33.9 31.0 - 37.0 g/dL    RDW 13.8 11.6 - 14.5 %    PLATELET 079 452 - 674 K/uL    MPV 10.1 9.2 - 11.8 FL    NEUTROPHILS 85 (H) 40 - 73 %    BAND NEUTROPHILS 6 %    LYMPHOCYTES 4 (L) 21 - 52 %    MONOCYTES 4 3 - 10 %    EOSINOPHILS 1 0 - 5 %    BASOPHILS 0 0 - 2 %    ABS. NEUTROPHILS 22.4 (H) 1.8 - 8.0 K/UL    ABS. LYMPHOCYTES 1.0 0.9 - 3.6 K/UL    ABS. MONOCYTES 1.0 0.05 - 1.2 K/UL    ABS. EOSINOPHILS 0.2 0.0 - 0.4 K/UL    ABS. BASOPHILS 0.0 0.0 - 0.1 K/UL    DF MANUAL      PLATELET COMMENTS ADEQUATE PLATELETS      RBC COMMENTS NORMOCYTIC, NORMOCHROMIC         RECENT RESULTS  MODALITY IMPRESSION   XR Results from Hospital Encounter encounter on 02/05/21   XR HAND RT MIN 3 V    Narrative RIGHT HAND RADIOGRAPH-3 VIEWS    INDICATION: Skin infection. COMPARISON: None    FINDINGS:  No evidence for acute fracture or dislocation. Mild degenerative change at the  first digit interphalangeal joint. Mild joint space narrowing at the first MCP  joint first [facility] joint. Prominent soft tissue swelling noted at the hyperthenar  aspect of the hand. Additional mild soft tissue swelling extends along the  dorsal aspect of the hand and ulnar aspect of the wrist. No evidence for  retained radiopaque foreign body. Impression 1. No acute osseous finding. 2. Soft tissue swelling as described. CT Results from [place] encounter on 04/08/21   CT NECK SOFT TISSUE W CONT    Narrative Neck CT With Contrast    CLINICAL INDICATION: Neck pain for one week.  Redness and swelling mass posterior  upper neck below the base of skull, increasing. COMPARISON: None. TECHNIQUE: Axial enhanced images of the neck. 80 cc Isovue-300 nonionic IV  contrast.  All CT scans are performed using dose optimization techniques as  appropriate to the performed exam including the following: Automated exposure  control, adjustment of mA and/or kV according to patient size, and use of  iterative reconstructive technique. FINDINGS:   Subcutaneous soft tissue swelling with skin thickening throughout the posterior  neck, greatest throughout the upper neck. No rim enhanced fluid collection. Cervical spine or calvarial osseous findings. Degenerative disc space narrowing  with small osteophytes throughout the mid and lower cervical spine C4-C7 levels. Incidental thoracic vertebral hemangiomas. The sinuses are clear. Dental cavity and mild periapical abscess/erosions  involving the left lower first molar tooth without periodontal soft tissue  abnormalities. Orbits and partially included face of brain are within normal  limits. Parotid glands, submandibular glands, and thyroid gland are  unremarkable. Subcentimeter cervical lymph nodes. Pharynx and larynx mucosa is  unremarkable. While not closely evaluated, the major vessels of the neck are  grossly patent. Lung apex is clear. Mildly enlarged 1 cm AP window lymph nodes in the  mediastinum. Impression 1. Soft tissue swelling throughout the cutaneous and subcutaneous posterior neck  may represent cellulitis, without abscess. 2. Left mandibular first molar tooth dental and periodontal disease. 3. Borderline mediastinal AP window lymphadenopathy. Consider complete CT chest  follow-up in 3 months for stability. MRI No results found for this or any previous visit. ULTRASOUND Results from [place] encounter on 08/08/12   US EXT NONVAS LT LTD    Impression IMPRESSION:    Soft tissue swelling. No evidence for free fluid or soft tissue mass.         Cardiology Procedures/Testing:  MODALITY RESULTS   EKG No results found for this or any previous visit.     ECHO       Special Testing/Procedures:  MODALITY RESULTS   MICRO All Micro Results     Procedure Component Value Units Date/Time    CULTURE, MRSA [[id number]]     Order Status: Sent Specimen: Nasal from Nares     CULTURE, BLOOD [[id number]] Collected: 04/11/21 1630    Order Status: Completed Specimen: Blood Updated: 04/12/21 0701     Special Requests: NO SPECIAL REQUESTS        Culture result: NO GROWTH AFTER 13 HOURS       CULTURE, BLOOD [[id number]] Collected: 04/11/21 1642    Order Status: Completed Specimen: Blood Updated: 04/12/21 0701     Special Requests: NO SPECIAL REQUESTS        Culture result: NO GROWTH AFTER 13 HOURS       CULTURE, [place] STAIN [[id number]] Collected: 04/11/21 2144    Order Status: Completed Updated: 04/11/21 2217    CULTURE, [place] STAIN [[id number]] Collected: 04/11/21 2144    Order Status: Completed Specimen: Wound from Neck Updated: 04/11/21 2215    CULTURE, MRSA [[id number]] Collected: 04/11/21 1800    Order Status: Canceled Specimen: Nasal          UA Results for orders placed or performed in visit on 09/18/20   AMB POC URINALYSIS DIP STICK AUTO W/O MICRO     Status: None   Result Value Ref Range Status    Color (UA POC) Yellow  Final    Clarity (UA POC) Clear  Final    Glucose (UA POC) Negative Negative Final    Bilirubin (UA POC) Negative Negative Final    Ketones (UA POC) Trace Negative Final    Specific gravity (UA POC) 1.020 1.001 - 1.035 Final    Blood (UA POC) Negative Negative Final    pH (UA POC) 6.5 4.6 - 8.0 Final    Protein (UA POC) 1+ Negative Final    Urobilinogen (UA POC) 0.2 mg/dL 0.2 - 1 Final    Nitrites (UA POC) Negative Negative Final    Leukocyte esterase (UA POC) Negative Negative Final        Telemetry NONE   Oxygen NONE     Assessment and Plan:     56 y.o. male with PMH HTN, HLD and GERD, now admitted with cellulitis of neck.     Sepsis 2/2 to cellulitis at neck: Cellulitis at back of neck with outpatient treatment failure. Patient seen in the ED and discharged home with clindamycin with no resolution of symptoms. DDx include cellulitis vs. Abscess. Recurrent cellulitis most likely secondary to Hydradenitis suppurativa. Patient has been afebrile, hypertensive to 146/94 with mild tachycardia to 103. Labs significant for leukocytosis (WBC 28.3) with left shift, lactic acid wnl. Meets 2/4 SIRS criteria (tachycardia and leukocytosis).  CT showed Soft tissue swelling throughout the cutaneous and subcutaneous posterior neck (cellulitis w/o abscess), Left mandibular first molar tooth dental and periodontal disease  - Continue on medicine inpatient floor  - Continue Vancomycin 1500mg q12h  - FU blood culture  - Pain control - tylenol and ibuprofen prn, Norco prn  - FU CT maxillofacial with contrast   - ID consulted, appreciate recs  - wound care consulted, appreciate recs  - warm compress  - Labs: MRSA nasal swab, wound culture  - Daily labs: CBC, BMP  - VSS as per protocol     HTN: BP on admission 146/94  - Continue home Amlodipine 10mg qD and Losartan 10mg qD     Hyperkalemia: K+ 3.3   - replete as per protocol  - repeat BMP     HLD: Continue home Lipitor 10mg qHS and ASA 81mg qD     GERD: Omeprazole 20mg qD at home  - substitute with Protonix 20mg qD     Global Care:  - PT/OT   - case management   Diet Regular   DVT Prophylaxis Lovenox   Code status Full   Disposition  <2nights      Point of Contact Song Rhodes  Relationship:Friend  [phone]     Mark Galvan  Relationship:Friend  [phone]       Rosa Isela Dave MD, PGY1   [address] [facility] Medicine   Intern Pager: [phone]   April 12, 2021, 9:32 AM

## 2021-04-12 NOTE — CONSULTS
General Surgery Consult    Azalea Steen  Admit date: 2021    MRN: [id number]     : 1960     Age: 56 y.o. Attending Physician: Moira Park MD [facility]      History of Present Illness:      Azalea Steen is a 56 y.o. male who I was consulted earlier today in the morning for evaluation of a severe neck cellulitis and possible necrotic tissue and an abscess. The patient presented few days ago with the neck cellulitis and he was started on antibiotic and then he was discharged home from the emergency room. However because he continued to have severe neck pain and worsening of his cellulitis he presented back to the emergency room yesterday where he was admitted and started on IV antibiotics. He had a CT scan 5 days ago when he presented the first time to the emergency room that showed the cellulitis with no evidence of abscess but he did not have any imaging studies after that. He has been ordered a CT scan of the neck for today but it was not done yet. According to the team please stated that the patient the cellulitis has getting worse and because there was some drainage of pus they decided to consult us. They already consulted the infectious disease and the antibiotic has been adjusted according to the team.  When I got the call earlier in the morning I asked the team to place him n.p.o. in case he needs surgery but unfortunately when I went to see him at lunchtime he was eating his lunch. The patient stated that he is feeling relatively well but he believes the cellulitis has either increased or stayed the same since the last 24 hours. He has no respiratory difficulties. He actually denies any fever or chills but he had fever yesterday but currently he is slightly hypothermic. He had tachycardia before but currently his heart rate is in the 80. It seems he is a scheduled to have the CT scan later today.     Patient Active Problem List    Diagnosis Date Noted    Sepsis due to cellulitis [facility]) 04/11/2021    Cellulitis 04/11/2021     Past Medical History:   Diagnosis Date    Hypertension       History reviewed. No pertinent surgical history. Social History     Tobacco Use    Smoking status: Never Smoker    Smokeless tobacco: Never Used   Substance Use Topics    Alcohol use: Yes     Comment: socially      Social History     Tobacco Use   Smoking Status Never Smoker   Smokeless Tobacco Never Used     History reviewed. No pertinent family history.    Current Facility-Administered Medications   Medication Dose Route Frequency    vancomycin (VANCOCIN) 1500 mg in  ml infusion  1,500 mg IntraVENous Q12H    ampicillin-sulbactam (UNASYN) 3 g in 0.9% sodium chloride (MBP/ADV) 100 mL MBP  3 g IntraVENous Q6H    amLODIPine (NORVASC) tablet 10 mg  10 mg Oral DAILY    [Held by provider] aspirin delayed-release tablet 81 mg  81 mg Oral DAILY    losartan (COZAAR) tablet 100 mg  100 mg Oral DAILY    pantoprazole (PROTONIX) tablet 20 mg  20 mg Oral DAILY    sodium chloride (NS) flush 5-40 mL  5-40 mL IntraVENous Q8H    sodium chloride (NS) flush 5-40 mL  5-40 mL IntraVENous PRN    acetaminophen (TYLENOL) tablet 650 mg  650 mg Oral Q6H PRN    Or    acetaminophen (TYLENOL) suppository 650 mg  650 mg Rectal Q6H PRN    polyethylene glycol (MIRALAX) packet 17 g  17 g Oral DAILY PRN    promethazine (PHENERGAN) tablet 12.5 mg  12.5 mg Oral Q6H PRN    Or    ondansetron (ZOFRAN) injection 4 mg  4 mg IntraVENous Q6H PRN    [Held by provider] enoxaparin (LOVENOX) injection 40 mg  40 mg SubCUTAneous DAILY    atorvastatin (LIPITOR) tablet 10 mg  10 mg Oral QHS    ibuprofen (MOTRIN) tablet 600 mg  600 mg Oral Q6H PRN    HYDROcodone-acetaminophen (NORCO) 5-325 mg per tablet 1 Tab  1 Tab Oral Q4H PRN      No Known Allergies       Review of Systems:  Constitutional: negative  Eyes: negative  Ears, Nose, Mouth, Throat, and Face: negative  Respiratory: negative  Cardiovascular: negative  Gastrointestinal: negative  Genitourinary:negative  Integument/Breast: positive for rash and skin lesion(s)  Hematologic/Lymphatic: negative  Musculoskeletal:positive for neck pain  Neurological: negative  Behavioral/Psychiatric: negative  Endocrine: negative  Allergic/Immunologic: negative    Objective:     Visit Vitals  BP (!) 153/97   Pulse (!) 104   Temp 98.2 °F (36.8 °C)   Resp 20   Ht 5' 7\" (1.702 m)   Wt 86.2 kg (190 lb)   SpO2 98%   BMI 29.76 kg/m²       Physical Exam:      General:  in no apparent distress, well developed and well nourished, non-toxic, alert, oriented times 3 and cooperative   Eyes:  conjunctivae and sclerae normal, pupils equal, round, reactive to light   Throat & Neck:  There is a large neck cellulitis located in the posterior middle neck with overlying skin necrosis. There is a large cellulitis extending all the way to the anterior neck on both side and up into the scalp as well as back into the upper back. There is induration at the site of the skin necrosis but there is no clear fluctuation and no pus. However there is some whitish thick material along the skin necrosis.     Lungs:   clear to auscultation bilaterally   Heart:  Regular rate and rhythm   Abdomen:   rounded and obese, soft, nontender, nondistended, no masses or organomegaly   Extremities: extremities normal, atraumatic, no cyanosis or edema   Skin: Normal.       Imaging and Lab Review:     CBC:   Lab Results   Component Value Date/Time    WBC 24.6 (H) 04/12/2021 01:45 AM    RBC 4.72 04/12/2021 01:45 AM    HGB 14.2 04/12/2021 01:45 AM    HCT 41.9 04/12/2021 01:45 AM    PLATELET 655 66/29/1352 01:45 AM     BMP:   Lab Results   Component Value Date/Time    Glucose 134 (H) 04/12/2021 01:45 AM    Sodium 141 04/12/2021 01:45 AM    Potassium 3.4 (L) 04/12/2021 01:45 AM    Chloride 106 04/12/2021 01:45 AM    CO2 28 04/12/2021 01:45 AM    BUN 14 04/12/2021 01:45 AM    Creatinine 0.76 04/12/2021 01:45 AM    Calcium 7.7 (L) 04/12/2021 01:45 AM     CMP:  Lab Results   Component Value Date/Time    Glucose 134 (H) 04/12/2021 01:45 AM    Sodium 141 04/12/2021 01:45 AM    Potassium 3.4 (L) 04/12/2021 01:45 AM    Chloride 106 04/12/2021 01:45 AM    CO2 28 04/12/2021 01:45 AM    BUN 14 04/12/2021 01:45 AM    Creatinine 0.76 04/12/2021 01:45 AM    Calcium 7.7 (L) 04/12/2021 01:45 AM    Anion gap 7 04/12/2021 01:45 AM    BUN/Creatinine ratio 18 04/12/2021 01:45 AM    Alk. phosphatase 76 04/08/2021 07:44 PM    Protein, total 7.7 04/08/2021 07:44 PM    Albumin 3.4 04/08/2021 07:44 PM    Globulin 4.3 (H) 04/08/2021 07:44 PM    A-G Ratio 0.8 04/08/2021 07:44 PM       Recent Results (from the past 24 hour(s))   CBC WITH AUTOMATED DIFF    Collection Time: 04/11/21  3:10 PM   Result Value Ref Range    WBC 28.3 (H) 4.6 - 13.2 K/uL    RBC 5.03 4.35 - 5.65 M/uL    HGB 15.0 13.0 - 16.0 g/dL    HCT 44.3 36.0 - 48.0 %    MCV 88.1 74.0 - 97.0 FL    MCH 29.8 24.0 - 34.0 PG    MCHC 33.9 31.0 - 37.0 g/dL    RDW 13.7 11.6 - 14.5 %    PLATELET 490 928 - 600 K/uL    MPV 9.4 9.2 - 11.8 FL    NEUTROPHILS 86 (H) 40 - 73 %    LYMPHOCYTES 5 (L) 21 - 52 %    MONOCYTES 9 3 - 10 %    EOSINOPHILS 0 0 - 5 %    BASOPHILS 0 0 - 2 %    ABS. NEUTROPHILS 24.4 (H) 1.8 - 8.0 K/UL    ABS. LYMPHOCYTES 1.4 0.9 - 3.6 K/UL    ABS. MONOCYTES 2.5 (H) 0.05 - 1.2 K/UL    ABS. EOSINOPHILS 0.0 0.0 - 0.4 K/UL    ABS.  BASOPHILS 0.0 0.0 - 0.1 K/UL    DF MANUAL      PLATELET COMMENTS ADEQUATE PLATELETS      RBC COMMENTS NORMOCYTIC, NORMOCHROMIC     METABOLIC PANEL, BASIC    Collection Time: 04/11/21  3:10 PM   Result Value Ref Range    Sodium 138 136 - 145 mmol/L    Potassium 3.3 (L) 3.5 - 5.5 mmol/L    Chloride 101 100 - 111 mmol/L    CO2 30 21 - 32 mmol/L    Anion gap 7 3.0 - 18 mmol/L    Glucose 107 (H) 74 - 99 mg/dL    BUN 13 7.0 - 18 MG/DL    Creatinine 1.03 0.6 - 1.3 MG/DL    BUN/Creatinine ratio 13 12 - 20      GFR est AA >60 >60 ml/min/1.73m2    GFR est non-AA >60 >60 ml/min/1.73m2 Calcium 8.7 8.5 - 10.1 MG/DL   CULTURE, BLOOD    Collection Time: 04/11/21  4:30 PM    Specimen: Blood   Result Value Ref Range    Special Requests: NO SPECIAL REQUESTS      Culture result: NO GROWTH AFTER 13 HOURS     CULTURE, BLOOD    Collection Time: 04/11/21  4:42 PM    Specimen: Blood   Result Value Ref Range    Special Requests: NO SPECIAL REQUESTS      Culture result: NO GROWTH AFTER 13 HOURS     POC LACTIC ACID    Collection Time: 04/11/21  4:54 PM   Result Value Ref Range    Lactic Acid (POC) 1.02 0.40 - 8.57 mmol/L   METABOLIC PANEL, BASIC    Collection Time: 04/12/21  1:45 AM   Result Value Ref Range    Sodium 141 136 - 145 mmol/L    Potassium 3.4 (L) 3.5 - 5.5 mmol/L    Chloride 106 100 - 111 mmol/L    CO2 28 21 - 32 mmol/L    Anion gap 7 3.0 - 18 mmol/L    Glucose 134 (H) 74 - 99 mg/dL    BUN 14 7.0 - 18 MG/DL    Creatinine 0.76 0.6 - 1.3 MG/DL    BUN/Creatinine ratio 18 12 - 20      GFR est AA >60 >60 ml/min/1.73m2    GFR est non-AA >60 >60 ml/min/1.73m2    Calcium 7.7 (L) 8.5 - 10.1 MG/DL   CBC WITH AUTOMATED DIFF    Collection Time: 04/12/21  1:45 AM   Result Value Ref Range    WBC 24.6 (H) 4.6 - 13.2 K/uL    RBC 4.72 4.35 - 5.65 M/uL    HGB 14.2 13.0 - 16.0 g/dL    HCT 41.9 36.0 - 48.0 %    MCV 88.8 74.0 - 97.0 FL    MCH 30.1 24.0 - 34.0 PG    MCHC 33.9 31.0 - 37.0 g/dL    RDW 13.8 11.6 - 14.5 %    PLATELET 239 462 - 834 K/uL    MPV 10.1 9.2 - 11.8 FL    NEUTROPHILS 85 (H) 40 - 73 %    BAND NEUTROPHILS 6 %    LYMPHOCYTES 4 (L) 21 - 52 %    MONOCYTES 4 3 - 10 %    EOSINOPHILS 1 0 - 5 %    BASOPHILS 0 0 - 2 %    ABS. NEUTROPHILS 22.4 (H) 1.8 - 8.0 K/UL    ABS. LYMPHOCYTES 1.0 0.9 - 3.6 K/UL    ABS. MONOCYTES 1.0 0.05 - 1.2 K/UL    ABS. EOSINOPHILS 0.2 0.0 - 0.4 K/UL    ABS.  BASOPHILS 0.0 0.0 - 0.1 K/UL    DF MANUAL      PLATELET COMMENTS ADEQUATE PLATELETS      RBC COMMENTS NORMOCYTIC, NORMOCHROMIC         images and reports reviewed    Assessment:   Richard Sharif is a 56 y.o. male who is presenting with severe neck cellulitis and necrotic tissue. The patient presented to the emergency room the first time and because there was only cellulitis he was prescribed clindamycin and he had a CT scan that did not show any collection so he was sent home. Unfortunately he did get worse and he presented back to the emergency room where he was admitted to the medicine service and he was started on IV antibiotics. However the patient is not getting better and now there is skin necrosis at the site of the cellulitis exactly in the middle in the upper posterior neck. I explained to the patient that for sure he will need surgical debridement because there is some evidence of necrotic skin and I am not sure that he has an abscess yet but it could be a sebaceous cyst that is infected or just inflammation with no clear collection/abscess. However I told him and we will do the debridement of the skin necrosis we will have to evaluate for any drainable collection like an abscess. I also told him that the antibiotic should make sure the collection and make it either a formed abscess or improve his cellulitis where he will get better. However unfortunately I believe he will need a surgical debridement because of the skin necrosis mainly, and I explained to him that he will have a wound that need to be cared for with packing on a daily basis and possible wound care nurse consult and possible wound VAC depending on how the wound will be after the surgery. Also because the patient just ate lunch I called the OR  and they were able to give us a time tomorrow at 10:30 in the morning. So we will place the patient n.p.o. and I already spoke with the medicine team and we discussed the plan.      Plan:     Appreciate medicine management and the consultation  Follow-up ID recommendation on the antibiotics  N.p.o. after midnight  Patient is scheduled for debridement of necrotic tissue and possible drainage of posterior neck abscess  Wound care nurse consultation in preparation for a large open wound in the neck secondary to his skin necrosis  We will follow closely    Please call me if you have any questions (cell phone: [phone])     Signed By: Bossman Campa MD     April 12, 2021

## 2021-04-12 NOTE — PROGRESS NOTES
Problem: Self Care Deficits Care Plan (Adult)  Goal: *Acute Goals and Plan of Care (Insert Text)  Outcome: Resolved/Met       OCCUPATIONAL THERAPY EVALUATION/DISCHARGE    Patient: Zac Corrales (48 y.o. male)  Date: 4/12/2021  Primary Diagnosis: Sepsis due to cellulitis ([facility] [address] [L03.90, A41.9]  Cellulitis [L03.90]  Precautions: (standard)  PLOF:  Patient was independent with self-care and functional mobility PTA. ASSESSMENT AND RECOMMENDATIONS:  Patient cleared to participate in OT evaluation by RN. Upon entering the room, patient was seated in chair, alert, and agreeable to participate in OT evaluation. Patient educated on the role of OT, evaluation process, and safety during this admission with patient verbalizing understanding. RN briefly present this session to administer pain medications. CNA also briefly present to address vitals. Patient educated on the importance of shoulder flex/ext, neck flex/ext, neck lateral rotation as well as shoulder shrugs to maintain function. Patient able to demonstrate AROM to perform exercises, however limited 2/2 increased neck pain. Patient educated on having items in household lowered to eye level, limiting heavy lifting and wearing button down shirts pending pain. Patient is independent - modified independent with basic self-care and independent with functional transfers/mobility using no AD. Based on the objective data described below, the patient presents with no deficits that impede pt function with ADLs, functional transfers, and functional mobility. OT to d/c from caseload at this time. Skilled occupational therapy is not indicated at this time.   Discharge Recommendations: None  Further Equipment Recommendations for Discharge: N/A      SUBJECTIVE:   Patient stated I cant say blanc im going to feel tomorrow but today I dont need help referring to selfcare tasks    OBJECTIVE DATA SUMMARY:     Past Medical History:   Diagnosis Date    Hypertension    History reviewed. No pertinent surgical history. Barriers to Learning/Limitations: None  Compensate with: visual, verbal, tactile, kinesthetic cues/model    Home Situation:   Home Situation  Home Environment: Private residence  One/Two Story Residence: One story  Living Alone: Yes  Support Systems: Friends \ neighbors  Patient Expects to be Discharged to[de-identified] Private residence  Current DME Used/Available at Home: None  Tub or Shower Type: Tub/Shower combination  [x]     Right hand dominant   []     Left hand dominant    Cognitive/Behavioral Status:  Neurologic State: Alert  Orientation Level: Oriented X4  Cognition: Follows commands  Safety/Judgement: Fall prevention; Awareness of environment    Skin: Intact  Edema: None noted    Vision/Perceptual:    Tracking: Able to track stimulus in all quadrants w/o difficulty    Diplopia: No    Acuity: Within Defined Limits(reports \"neck pain hurts eyes\")    Corrective Lenses: Glasses    Coordination: BUE  Coordination: Within functional limits(B LEs)  Fine Motor Skills-Upper: Left Intact; Right Intact    Gross Motor Skills-Upper: Left Intact; Right Intact    Balance:  Sitting: Intact  Standing: Intact    Strength: BUE  Strength: Generally decreased, functional    Tone & Sensation: BUE  Tone: Normal  Sensation: Intact    Range of Motion: BUE  AROM: Generally decreased, functional(B shoulders limited d/t neck pain)      Functional Mobility and Transfers for ADLs:  Bed Mobility:  Sit to Supine: Independent    Transfers:  Sit to Stand: Independent  Stand to Sit: Independent   Toilet Transfer : Independent      ADL Assessment:  Feeding: Independent    Oral Facial Hygiene/Grooming: Modified Independent    Bathing: Modified independent; Additional time    Upper Body Dressing: Modified independent    Lower Body Dressing: Modified independent    Toileting: Modified independent    ADL Intervention:  Grooming  Grooming Assistance: Modified independent  Position Performed: Seated in chair  Brushing/Combing Hair: Modified independent(simulation)    Upper Body Dressing Assistance  Dressing Assistance: Modified independent  Hospital Gown: Modified independent    Lower Body Dressing Assistance  Dressing Assistance: Modified independent  Socks: Modified independent(additional time needed and rest break 2/2 discomfort)  Leg Crossed Method Used: Yes  Position Performed: Seated in chair    Cognitive Retraining  Safety/Judgement: Fall prevention; Awareness of environment      Pain:  Pain level pre-treatment: 10/10   Pain level post-treatment: 10/10   Pain Intervention(s): Medication (see MAR); Response to intervention: Nurse notified, See doc flow    Activity Tolerance:   Patient demonstrated fair+/good activity tolerance during OT evaluation. Please refer to the flowsheet for vital signs taken during this treatment. After treatment:   [x]  Patient left in no apparent distress sitting up in chair  []  Patient left in no apparent distress in bed  [x]  Call bell left within reach  [x]  Nursing notified  []  Caregiver present  []  Bed alarm activated    COMMUNICATION/EDUCATION:   [x]      Role of Occupational Therapy in the acute care setting  [x]      Home safety education was provided and the patient/caregiver indicated understanding. [x]      Patient/family have participated as able and agree with findings and recommendations. []      Patient is unable to participate in plan of care at this time. Thank you for this referral.  Yamilet Faustin OTR/L  Time Calculation: 38 mins      Eval Complexity: History: MEDIUM Complexity : Expanded review of history including physical, cognitive and psychosocial  history ; Examination: LOW Complexity : 1-3 performance deficits relating to physical, cognitive , or psychosocial skils that result in activity limitations and / or participation restrictions ;    Decision Making:LOW Complexity : No comorbidities that affect functional and no verbal or physical assistance needed to complete eval tasks

## 2021-04-12 NOTE — WOUND CARE
Physical Exam 
Neck:  
 
 
  
Focused assessment Rm 516 POA lesion to posterior neck 2cm x 6cm with fungating hypergranular tissue. Wound base is cailin red/purple with patches of white material. Drainage is serous and copious. Lesion to neck is only open lesion, other lesions to arm, axilla and groin resolved, scarring noted. Topical treatment protocol in place. At this time leave open to air, may provide patient with dry gauze to dab surrounding skin to pat dry. Await eval by infectious disease for definitive diagnosis. Care turned over to nursing staff at this time. Ailyn Meyers RN, BSN, [facility]

## 2021-04-12 NOTE — PROGRESS NOTES
Problem: Mobility Impaired (Adult and Pediatric)  Goal: *Acute Goals and Plan of Care (Insert Text)  Outcome: Resolved/Met   PHYSICAL THERAPY EVALUATION AND DISCHARGE    Patient: Douglas Rehman (42 y.o. male)  Date: 4/12/2021  Primary Diagnosis: [facility] [L03.90, A41.9]  Cellulitis [L03.90]        Precautions: fall       PLOF: independent with mobility without an assistive device    ASSESSMENT :  Based on the objective data described below, the patient presents at an independent level with ambulation and transfers without an assistive device. Pt does have decreased cervical ROM however PT intervention is not recommended at this time due to active infection. Patient does not require further skilled intervention at this level of care. PLAN :  Recommendations and Planned Interventions:   No formal PT needs identified at this time. Discharge Recommendations: None  Further Equipment Recommendations for Discharge: N/A     SUBJECTIVE:   Patient stated It's good to know that I'm allowed to move around.     OBJECTIVE DATA SUMMARY:     Past Medical History:   Diagnosis Date    Hypertension    History reviewed. No pertinent surgical history. Barriers to Learning/Limitations: None  Compensate with: N/A  Home Situation:   Home Situation  Home Environment: Private residence  One/Two Story Residence: One story  Living Alone: Yes  Support Systems: Friends \ neighbors  Patient Expects to be Discharged to[de-identified] Private residence  Current DME Used/Available at Home: None  Critical Behavior:   Calm and cooperative      Strength:    Strength:  Within functional limits(B LEs)   Tone & Sensation:   Tone: Normal(B LEs)   Range Of Motion:  AROM: Within functional limits(B LEs)   Functional Mobility:  Bed Mobility:  Rolling: Independent  Supine to Sit: Independent  Sit to Supine: Independent     Transfers:  Sit to Stand: Independent  Stand to Sit: Independent        Balance:   Sitting: Intact  Standing: Intact; Without support  Ambulation/Gait Training:  Distance (ft): 500 Feet (ft)  Assistive Device: (none)  Ambulation - Level of Assistance: Independent  Pain:  Pain level pre-treatment: 4/10   Pain level post-treatment: 4/10  Pain Intervention(s):  Rest  Response to intervention: Nurse notified, See doc flow    Activity Tolerance:   Fair+  Please refer to the flowsheet for vital signs taken during this treatment. After treatment:   []         Patient left in no apparent distress sitting up in chair  [x]         Patient left in no apparent distress in bed  [x]         Call bell left within reach  []         Nursing notified  []         Caregiver present  []         Bed alarm activated  []         SCDs applied    COMMUNICATION/EDUCATION:   [x]         Role of Physical Therapy in the acute care setting. [x]         Fall prevention education was provided and the patient/caregiver indicated understanding. [x]         Patient/family have participated as able in goal setting and plan of care.-eval and d/c  []         Patient/family agree to work toward stated goals and plan of care. []         Patient understands intent and goals of therapy, but is neutral about his/her participation. []         Patient is unable to participate in goal setting/plan of care: ongoing with therapy staff.  []         Other:     Thank you for this referral.  Gavi Verma, PT   Time Calculation: 20 mins      Eval Complexity: History: MEDIUM  Complexity : 1-2 comorbidities / personal factors will impact the outcome/ POC Exam:MEDIUM Complexity : 3 Standardized tests and measures addressing body structure, function, activity limitation and / or participation in recreation  Presentation: MEDIUM Complexity : Evolving with changing characteristics  Clinical Decision Making:Low Complexity    Overall Complexity:LOW

## 2021-04-13 ENCOUNTER — ANESTHESIA (OUTPATIENT)
Dept: SURGERY | Age: 61
DRG: 853 | End: 2021-04-13
Payer: COMMERCIAL

## 2021-04-13 LAB
ANION GAP SERPL CALC-SCNC: 10 MMOL/L (ref 3–18)
BACTERIA SPEC CULT: ABNORMAL
BACTERIA SPEC CULT: ABNORMAL
BASOPHILS # BLD: 0 K/UL (ref 0–0.1)
BASOPHILS NFR BLD: 0 % (ref 0–2)
BUN SERPL-MCNC: 14 MG/DL (ref 7–18)
BUN/CREAT SERPL: 19 (ref 12–20)
CALCIUM SERPL-MCNC: 8.2 MG/DL (ref 8.5–10.1)
CHLORIDE SERPL-SCNC: 108 MMOL/L (ref 100–111)
CO2 SERPL-SCNC: 23 MMOL/L (ref 21–32)
COVID-19 RAPID TEST, COVR: NOT DETECTED
CREAT SERPL-MCNC: 0.72 MG/DL (ref 0.6–1.3)
DIFFERENTIAL METHOD BLD: ABNORMAL
EOSINOPHIL # BLD: 0 K/UL (ref 0–0.4)
EOSINOPHIL NFR BLD: 0 % (ref 0–5)
ERYTHROCYTE [DISTWIDTH] IN BLOOD BY AUTOMATED COUNT: 14 % (ref 11.6–14.5)
GLUCOSE SERPL-MCNC: 86 MG/DL (ref 74–99)
HBA1C MFR BLD: 5.4 % (ref 4.2–5.6)
HCT VFR BLD AUTO: 41.7 % (ref 36–48)
HGB BLD-MCNC: 13.9 G/DL (ref 13–16)
LYMPHOCYTES # BLD: 1.1 K/UL (ref 0.9–3.6)
LYMPHOCYTES NFR BLD: 4 % (ref 21–52)
MCH RBC QN AUTO: 30.2 PG (ref 24–34)
MCHC RBC AUTO-ENTMCNC: 33.3 G/DL (ref 31–37)
MCV RBC AUTO: 90.5 FL (ref 74–97)
MONOCYTES # BLD: 3 K/UL (ref 0.05–1.2)
MONOCYTES NFR BLD: 11 % (ref 3–10)
MYELOCYTES NFR BLD MANUAL: 1 %
NEUTS BAND NFR BLD MANUAL: 2 %
NEUTS SEG # BLD: 22.7 K/UL (ref 1.8–8)
NEUTS SEG NFR BLD: 82 % (ref 40–73)
PLATELET # BLD AUTO: 231 K/UL (ref 135–420)
PLATELET COMMENTS,PCOM: ABNORMAL
PMV BLD AUTO: 10.6 FL (ref 9.2–11.8)
POTASSIUM SERPL-SCNC: 3.7 MMOL/L (ref 3.5–5.5)
RBC # BLD AUTO: 4.61 M/UL (ref 4.35–5.65)
RBC MORPH BLD: ABNORMAL
SARS-COV-2, COV2: NORMAL
SERVICE CMNT-IMP: ABNORMAL
SODIUM SERPL-SCNC: 141 MMOL/L (ref 136–145)
SOURCE, COVRS: NORMAL
WBC # BLD AUTO: 27 K/UL (ref 4.6–13.2)

## 2021-04-13 PROCEDURE — 87075 CULTR BACTERIA EXCEPT BLOOD: CPT

## 2021-04-13 PROCEDURE — 74011250637 HC RX REV CODE- 250/637: Performed by: SURGERY

## 2021-04-13 PROCEDURE — 2709999900 HC NON-CHARGEABLE SUPPLY

## 2021-04-13 PROCEDURE — 74011250636 HC RX REV CODE- 250/636: Performed by: SURGERY

## 2021-04-13 PROCEDURE — 74011000272 HC RX REV CODE- 272: Performed by: SURGERY

## 2021-04-13 PROCEDURE — 87077 CULTURE AEROBIC IDENTIFY: CPT

## 2021-04-13 PROCEDURE — 76210000006 HC OR PH I REC 0.5 TO 1 HR: Performed by: SURGERY

## 2021-04-13 PROCEDURE — 74011250637 HC RX REV CODE- 250/637: Performed by: STUDENT IN AN ORGANIZED HEALTH CARE EDUCATION/TRAINING PROGRAM

## 2021-04-13 PROCEDURE — 74011250636 HC RX REV CODE- 250/636: Performed by: NURSE ANESTHETIST, CERTIFIED REGISTERED

## 2021-04-13 PROCEDURE — 74011250637 HC RX REV CODE- 250/637: Performed by: FAMILY MEDICINE

## 2021-04-13 PROCEDURE — 36415 COLL VENOUS BLD VENIPUNCTURE: CPT

## 2021-04-13 PROCEDURE — 65270000029 HC RM PRIVATE

## 2021-04-13 PROCEDURE — 80048 BASIC METABOLIC PNL TOTAL CA: CPT

## 2021-04-13 PROCEDURE — 87205 SMEAR GRAM STAIN: CPT

## 2021-04-13 PROCEDURE — 0JB50ZZ EXCISION OF LEFT NECK SUBCUTANEOUS TISSUE AND FASCIA, OPEN APPROACH: ICD-10-PCS | Performed by: SURGERY

## 2021-04-13 PROCEDURE — 87186 SC STD MICRODIL/AGAR DIL: CPT

## 2021-04-13 PROCEDURE — 74011250636 HC RX REV CODE- 250/636: Performed by: INTERNAL MEDICINE

## 2021-04-13 PROCEDURE — 83036 HEMOGLOBIN GLYCOSYLATED A1C: CPT

## 2021-04-13 PROCEDURE — 74011250636 HC RX REV CODE- 250/636: Performed by: FAMILY MEDICINE

## 2021-04-13 PROCEDURE — 76010000138 HC OR TIME 0.5 TO 1 HR: Performed by: SURGERY

## 2021-04-13 PROCEDURE — 87635 SARS-COV-2 COVID-19 AMP PRB: CPT

## 2021-04-13 PROCEDURE — 74011000250 HC RX REV CODE- 250: Performed by: NURSE ANESTHETIST, CERTIFIED REGISTERED

## 2021-04-13 PROCEDURE — 88304 TISSUE EXAM BY PATHOLOGIST: CPT

## 2021-04-13 PROCEDURE — 76060000032 HC ANESTHESIA 0.5 TO 1 HR: Performed by: SURGERY

## 2021-04-13 PROCEDURE — 77030040361 HC SLV COMPR DVT MDII -B: Performed by: SURGERY

## 2021-04-13 PROCEDURE — 2709999900 HC NON-CHARGEABLE SUPPLY: Performed by: SURGERY

## 2021-04-13 PROCEDURE — 10061 I&D ABSCESS COMP/MULTIPLE: CPT | Performed by: SURGERY

## 2021-04-13 PROCEDURE — 00300 ANES ALL PX INTEG H/N/PTRUNK: CPT | Performed by: ANESTHESIOLOGY

## 2021-04-13 PROCEDURE — 77030040922 HC BLNKT HYPOTHRM STRY -A: Performed by: SURGERY

## 2021-04-13 PROCEDURE — 74011000258 HC RX REV CODE- 258: Performed by: SURGERY

## 2021-04-13 PROCEDURE — 74011000258 HC RX REV CODE- 258: Performed by: INTERNAL MEDICINE

## 2021-04-13 PROCEDURE — 00300 ANES ALL PX INTEG H/N/PTRUNK: CPT | Performed by: NURSE ANESTHETIST, CERTIFIED REGISTERED

## 2021-04-13 PROCEDURE — 85025 COMPLETE CBC W/AUTO DIFF WBC: CPT

## 2021-04-13 RX ORDER — ONDANSETRON 2 MG/ML
INJECTION INTRAMUSCULAR; INTRAVENOUS AS NEEDED
Status: DISCONTINUED | OUTPATIENT
Start: 2021-04-13 | End: 2021-04-13 | Stop reason: HOSPADM

## 2021-04-13 RX ORDER — DEXAMETHASONE SODIUM PHOSPHATE 4 MG/ML
INJECTION, SOLUTION INTRA-ARTICULAR; INTRALESIONAL; INTRAMUSCULAR; INTRAVENOUS; SOFT TISSUE AS NEEDED
Status: DISCONTINUED | OUTPATIENT
Start: 2021-04-13 | End: 2021-04-13 | Stop reason: HOSPADM

## 2021-04-13 RX ORDER — ONDANSETRON 2 MG/ML
4 INJECTION INTRAMUSCULAR; INTRAVENOUS ONCE
Status: COMPLETED | OUTPATIENT
Start: 2021-04-13 | End: 2021-04-13

## 2021-04-13 RX ORDER — HYDROMORPHONE HYDROCHLORIDE 1 MG/ML
0.5 INJECTION, SOLUTION INTRAMUSCULAR; INTRAVENOUS; SUBCUTANEOUS
Status: DISCONTINUED | OUTPATIENT
Start: 2021-04-13 | End: 2021-04-13 | Stop reason: HOSPADM

## 2021-04-13 RX ORDER — PROPOFOL 10 MG/ML
INJECTION, EMULSION INTRAVENOUS AS NEEDED
Status: DISCONTINUED | OUTPATIENT
Start: 2021-04-13 | End: 2021-04-13 | Stop reason: HOSPADM

## 2021-04-13 RX ORDER — LIDOCAINE HYDROCHLORIDE 20 MG/ML
INJECTION, SOLUTION EPIDURAL; INFILTRATION; INTRACAUDAL; PERINEURAL AS NEEDED
Status: DISCONTINUED | OUTPATIENT
Start: 2021-04-13 | End: 2021-04-13 | Stop reason: HOSPADM

## 2021-04-13 RX ORDER — FENTANYL CITRATE 50 UG/ML
50 INJECTION, SOLUTION INTRAMUSCULAR; INTRAVENOUS AS NEEDED
Status: DISCONTINUED | OUTPATIENT
Start: 2021-04-13 | End: 2021-04-13 | Stop reason: HOSPADM

## 2021-04-13 RX ORDER — SUCCINYLCHOLINE CHLORIDE 20 MG/ML
INJECTION INTRAMUSCULAR; INTRAVENOUS AS NEEDED
Status: DISCONTINUED | OUTPATIENT
Start: 2021-04-13 | End: 2021-04-13 | Stop reason: HOSPADM

## 2021-04-13 RX ORDER — MIDAZOLAM HYDROCHLORIDE 1 MG/ML
INJECTION, SOLUTION INTRAMUSCULAR; INTRAVENOUS AS NEEDED
Status: DISCONTINUED | OUTPATIENT
Start: 2021-04-13 | End: 2021-04-13 | Stop reason: HOSPADM

## 2021-04-13 RX ORDER — SODIUM CHLORIDE, SODIUM LACTATE, POTASSIUM CHLORIDE, CALCIUM CHLORIDE 600; 310; 30; 20 MG/100ML; MG/100ML; MG/100ML; MG/100ML
25 INJECTION, SOLUTION INTRAVENOUS CONTINUOUS
Status: DISCONTINUED | OUTPATIENT
Start: 2021-04-13 | End: 2021-04-13 | Stop reason: HOSPADM

## 2021-04-13 RX ORDER — FENTANYL CITRATE 50 UG/ML
INJECTION, SOLUTION INTRAMUSCULAR; INTRAVENOUS AS NEEDED
Status: DISCONTINUED | OUTPATIENT
Start: 2021-04-13 | End: 2021-04-13 | Stop reason: HOSPADM

## 2021-04-13 RX ADMIN — HYDROMORPHONE HYDROCHLORIDE 0.5 MG: 1 INJECTION, SOLUTION INTRAMUSCULAR; INTRAVENOUS; SUBCUTANEOUS at 12:05

## 2021-04-13 RX ADMIN — FENTANYL CITRATE 50 MCG: 50 INJECTION, SOLUTION INTRAMUSCULAR; INTRAVENOUS at 11:12

## 2021-04-13 RX ADMIN — VANCOMYCIN HYDROCHLORIDE 1500 MG: 10 INJECTION, POWDER, LYOPHILIZED, FOR SOLUTION INTRAVENOUS at 06:06

## 2021-04-13 RX ADMIN — FENTANYL CITRATE 100 MCG: 50 INJECTION, SOLUTION INTRAMUSCULAR; INTRAVENOUS at 10:57

## 2021-04-13 RX ADMIN — HYDROCODONE BITARTRATE AND ACETAMINOPHEN 1 TABLET: 5; 325 TABLET ORAL at 00:06

## 2021-04-13 RX ADMIN — Medication 10 ML: at 13:50

## 2021-04-13 RX ADMIN — Medication 10 ML: at 06:07

## 2021-04-13 RX ADMIN — Medication 10 ML: at 21:09

## 2021-04-13 RX ADMIN — FENTANYL CITRATE 50 MCG: 50 INJECTION, SOLUTION INTRAMUSCULAR; INTRAVENOUS at 11:14

## 2021-04-13 RX ADMIN — MIDAZOLAM HYDROCHLORIDE 2 MG: 2 INJECTION, SOLUTION INTRAMUSCULAR; INTRAVENOUS at 10:51

## 2021-04-13 RX ADMIN — LIDOCAINE HYDROCHLORIDE 50 MG: 20 INJECTION, SOLUTION EPIDURAL; INFILTRATION; INTRACAUDAL; PERINEURAL at 11:23

## 2021-04-13 RX ADMIN — SODIUM CHLORIDE 3 G: 900 INJECTION INTRAVENOUS at 13:40

## 2021-04-13 RX ADMIN — SODIUM CHLORIDE 3 G: 900 INJECTION INTRAVENOUS at 06:06

## 2021-04-13 RX ADMIN — ONDANSETRON 4 MG: 2 INJECTION INTRAMUSCULAR; INTRAVENOUS at 11:16

## 2021-04-13 RX ADMIN — ONDANSETRON 4 MG: 2 INJECTION INTRAMUSCULAR; INTRAVENOUS at 12:06

## 2021-04-13 RX ADMIN — DEXAMETHASONE SODIUM PHOSPHATE 8 MG: 4 INJECTION, SOLUTION INTRAMUSCULAR; INTRAVENOUS at 11:03

## 2021-04-13 RX ADMIN — SODIUM CHLORIDE, SODIUM LACTATE, POTASSIUM CHLORIDE, AND CALCIUM CHLORIDE 25 ML/HR: 600; 310; 30; 20 INJECTION, SOLUTION INTRAVENOUS at 11:30

## 2021-04-13 RX ADMIN — PANTOPRAZOLE SODIUM 20 MG: 20 TABLET, DELAYED RELEASE ORAL at 08:07

## 2021-04-13 RX ADMIN — HYDROCODONE BITARTRATE AND ACETAMINOPHEN 1 TABLET: 5; 325 TABLET ORAL at 08:07

## 2021-04-13 RX ADMIN — LOSARTAN POTASSIUM 100 MG: 50 TABLET, FILM COATED ORAL at 08:07

## 2021-04-13 RX ADMIN — SODIUM CHLORIDE 3 G: 900 INJECTION INTRAVENOUS at 00:02

## 2021-04-13 RX ADMIN — SUCCINYLCHOLINE CHLORIDE 120 MG: 20 INJECTION, SOLUTION INTRAMUSCULAR; INTRAVENOUS at 10:59

## 2021-04-13 RX ADMIN — ATORVASTATIN CALCIUM 10 MG: 10 TABLET, FILM COATED ORAL at 21:09

## 2021-04-13 RX ADMIN — PROPOFOL 160 MG: 10 INJECTION, EMULSION INTRAVENOUS at 10:59

## 2021-04-13 RX ADMIN — LIDOCAINE HYDROCHLORIDE 50 MG: 20 INJECTION, SOLUTION EPIDURAL; INFILTRATION; INTRACAUDAL; PERINEURAL at 10:57

## 2021-04-13 RX ADMIN — AMLODIPINE BESYLATE 10 MG: 10 TABLET ORAL at 08:07

## 2021-04-13 RX ADMIN — VANCOMYCIN HYDROCHLORIDE 1500 MG: 10 INJECTION, POWDER, LYOPHILIZED, FOR SOLUTION INTRAVENOUS at 17:42

## 2021-04-13 RX ADMIN — SODIUM CHLORIDE 3 G: 900 INJECTION INTRAVENOUS at 21:04

## 2021-04-13 NOTE — PROGRESS NOTES
Bedside and Verbal shift change report given to Diane Garcia (oncoming nurse) by Vishnu Zhao RN (offgoing nurse). Report included the following information SBAR, Kardex, OR Summary, Procedure Summary, Intake/Output, MAR, Recent Results and Med Rec Status.

## 2021-04-13 NOTE — ROUTINE PROCESS
TRANSFER - IN REPORT: 
 
Verbal report received from [facility] RN(name) on Claribel Alar  being received from [facility](unit) for ordered procedure Report consisted of patients Situation, Background, Assessment and  
Recommendations(SBAR). Information from the following report(s) SBAR and Kardex was reviewed with the receiving nurse. Opportunity for questions and clarification was provided. Assessment completed upon patients arrival to unit and care assumed.

## 2021-04-13 NOTE — PROGRESS NOTES
Received pt alert and oriented and in pleasant spirits. Open area to back of neck weeping. Neck cleaned and linens changed. 2130 prn Motrin given per pt request for pain. Chg bath given.     0005 prn norco given at this time for pain    0735 report given to [facility] LEANNA RN with [facility] and SBAR

## 2021-04-13 NOTE — ANESTHESIA PREPROCEDURE EVALUATION
Relevant Problems   No relevant active problems       Anesthetic History   No history of anesthetic complications            Review of Systems / Medical History  Patient summary reviewed, nursing notes reviewed and pertinent labs reviewed    Pulmonary  Within defined limits                 Neuro/Psych   Within defined limits           Cardiovascular    Hypertension              Exercise tolerance: >4 METS     GI/Hepatic/Renal  Within defined limits              Endo/Other        Obesity     Other Findings              Physical Exam    Airway  Mallampati: III  TM Distance: 4 - 6 cm  Neck ROM: normal range of motion   Mouth opening: Normal     Cardiovascular  Regular rate and rhythm,  S1 and S2 normal,  no murmur, click, rub, or gallop  Rhythm: regular  Rate: normal         Dental  No notable dental hx       Pulmonary  Breath sounds clear to auscultation               Abdominal  GI exam deferred       Other Findings            Anesthetic Plan    ASA: 2  Anesthesia type: general          Induction: Intravenous  Anesthetic plan and risks discussed with: Patient

## 2021-04-13 NOTE — PROGRESS NOTES
[facility]  Post-procedure Progress Note  SUBJECTIVE  Pt seen at bedside following Debridement of necrotizing soft tissue of the neck by general surgery. Pt denies headaches, dysphagia, difficulty breathing, CP/SOB, abdominal pain, N/V following procedure. Pain is well controlled on current pain regimen. OBJECTIVE  Visit Vitals  /84 (BP 1 Location: Right upper arm, BP Patient Position: Supine)   Pulse 97   Temp 98.5 °F (36.9 °C)   Resp 18   Ht 5' 7\" (1.702 m)   Wt 86.2 kg (190 lb)   SpO2 95%   BMI 29.76 kg/m²       Recent Results (from the past 12 hour(s))   SARS-COV-2    Collection Time: 04/13/21  9:15 AM   Result Value Ref Range    SARS-CoV-2 Please find results under separate order     COVID-19 RAPID TEST    Collection Time: 04/13/21  9:15 AM   Result Value Ref Range    Specimen source Nasopharyngeal      COVID-19 rapid test Not detected NOTD         Physical examination  Consitutional: NAD, AAOx3  HEENT: posterior neck with bandage over surgical debridement site  Cardiovascular: RRR, no m/g/r  Respiratory: CTA b/l; good respiratory effort  Abdominal: Abdomen soft, NT/ND  Extremities: No edema, no cyanosis    ASSESSMENT/PLAN  VSS. ENT consulted, plan to see patient tomorrow 4/14 for concerns of severe soft tissue infection and cellulitis vs necrotizing fasciitis of posterior neck. Low concerns for necrotizing fasciitis as patient in NAD, stable vitals.      Josephine Isaac MD, PGY1   [address] Medicine   Intern Pager: [phone]   April 13, 2021

## 2021-04-13 NOTE — PROGRESS NOTES
Reason for Admission:  Sepsis due to cellulitis ([facility] [address] [L03.90, A41.9]  Cellulitis [L03.90]                 RUR Score:    9%            Plan for utilizing home health:    tbd                      Likelihood of Readmission:   LOW                         Transition of Care Plan:              Initial assessment completed with patient. Cognitive status of patient: oriented to time, place, person and situation. Face sheet information confirmed:  yes. The patient to participate in his discharge plan and to receive any needed information. This patient lives in a single family home with roommates. Patient is able to navigate steps as needed. Prior to hospitalization, patient was considered to be independent with ADLs/IADLS : yes    Patient has a current ACP document on file: no      Healthcare Decision Maker:     Click here to complete [address] including selection of the Healthcare Decision Maker Relationship (ie \"Primary\")    The patient will be available to transport patient home upon discharge. The patient has no medical equipment available in the home. Patient is not currently active with home health. Patient has not stayed in a skilled nursing facility or rehab. This patient is on dialysis :no     Currently, the discharge plan is pending hospital progression. The patient states that he can obtain his medications from the pharmacy, and take his medications as directed. Patient's current insurance is [employer]       Care Management Interventions  PCP Verified by CM: Yes  Mode of Transport at Discharge: Self(drives self)  Transition of Care Consult (CM Consult): Discharge Planning  Discharge Durable Medical Equipment: No  Physical Therapy Consult: No  Occupational Therapy Consult: No  Speech Therapy Consult: No  Current Support Network:  Other(lives with roommates)  Confirm Follow Up Transport: Self  Discharge Location  Discharge Placement: Unable to determine at this raffy Estes RN, BSN   Care Management  [phone]

## 2021-04-13 NOTE — ANESTHESIA POSTPROCEDURE EVALUATION
Procedure(s):  INCISION AND DRAINAGE NECK ABSCESS. general    Anesthesia Post Evaluation      Multimodal analgesia: multimodal analgesia used between 6 hours prior to anesthesia start to PACU discharge  Patient location during evaluation: PACU  Patient participation: complete - patient participated  Level of consciousness: awake and alert  Pain management: adequate  Airway patency: patent  Anesthetic complications: no  Cardiovascular status: acceptable  Respiratory status: acceptable  Hydration status: acceptable  Post anesthesia nausea and vomiting:  controlled  Final Post Anesthesia Temperature Assessment:  Normothermia (36.0-37.5 degrees C)      INITIAL Post-op Vital signs:   Vitals Value Taken Time   /87 04/13/21 1220   Temp 36.8 °C (98.2 °F) 04/13/21 1134   Pulse 104 04/13/21 1226   Resp 19 04/13/21 1226   SpO2 96 % 04/13/21 1228   Vitals shown include unvalidated device data.

## 2021-04-13 NOTE — PROGRESS NOTES
Brief Progress Note  [facility] [place]    SUBJECTIVE:   Evaluated pt at bedside to assess progression of soft tissue swelling. Pt denied any pain or SOB. He was conversational and informed that he has intermittent pain, but is well managed currently. He does report diaphoresis at this time but admits that he switches back and forth from feeling hot and cold. He is aware of being NPO for procedure tomorrow and does not report any complaints or questions at this time. OBJECTIVE:  Visit Vitals  BP (!) 146/89 (BP 1 Location: Right upper arm, BP Patient Position: At rest)   Pulse 94   Temp 96.9 °F (36.1 °C)   Resp 20   Ht 5' 7\" (1.702 m)   Wt 86.2 kg (190 lb)   SpO2 96%   BMI 29.76 kg/m²       Physical Exam  General: sitting up in bed, NAD, diaphoretic on head and neck  HEENT: erythema at R lateral aspect of mandible, swelling from below ears and below mandible evenly. Soft to touch  CARD: RRR, no murmur  PULM: CTA, non labored breathing  Neuro: A&O x3    ASSESSMENT & PLAN:  1. Informed pt to let nurse know immediately if feels SOB  2. Will continue to monitor  3. NPO at midnight      For full assessment and plan, please see daily progress note.      Narinder Monsalve DO, PGY-1  [address] [facility]  04/12/21 10:58 PM

## 2021-04-13 NOTE — PROGRESS NOTES
Infectious Disease progress Note        Reason: neck cellulitis, concern for dental infection    Current abx Prior abx   Vancomycin since 4/11/2021  clindamycin on 4/8/2021     Lines:       Assessment :    56 y.o. male with PMH HTN, HLD, GERD presented to [facility] on 4/11/21 with complaint of neck pain and swelling. History of recurrent abscesses- right wrist, right leg in the past    Clinical presentation c/w posterior neck cellulitis/abscess, necrotizing infection due to MRSA    Wound cultures 4/11- staph aureus    MRSA screen positive. Status post surgical incision and drainage 4/13/2021. Surgery help appreciated    Persistent pain and tenderness despite above antibiotics likely due to undrained infection    Left mandibular first molar tooth caries and periodontal disease: No evidence of dental abscess noted on exam or CT scan 4/12/21    Right axillary abscess-spontaneously drained last week. No evidence of persistent induration on today's exam    Recommendations:    1. Continue vancomycin  2. Follow-up susceptibility of staph aureus in wound cultures and modify antibiotics accordingly  3. Wound care per surgery team       Above plan was discussed in details with patient. Please call me if any further questions or concerns. Will continue to participate in the care of this patient. HPI:    Complains of pain at the back of the neck. Difficulty moving the neck. Denies subjective fever or chills. home Medication List    Details   clindamycin (CLEOCIN) 300 mg capsule Take 1 Cap by mouth four (4) times daily for 7 days. Qty: 28 Cap, Refills: 0      omega 3-dha-epa-fish oil (Fish Oil) 100-160-1,000 mg cap Take  by mouth. aspirin delayed-release 81 mg tablet Take  by mouth daily. omeprazole (PRILOSEC) 20 mg capsule Take  by mouth. atorvastatin (LIPITOR) 40 mg tablet Take  by mouth.      losartan (COZAAR) 100 mg tablet Take 100 mg by mouth daily.         amLODIPine (NORVASC) 5 mg tablet Take 10 mg by mouth daily. HYDROcodone-acetaminophen (NORCO) 5-325 mg per tablet Take 1 Tab by mouth every six (6) hours as needed for Pain for up to 7 days. Max Daily Amount: 4 Tabs. Qty: 14 Tab, Refills: 0    Associated Diagnoses: Cellulitis of neck             Current Facility-Administered Medications   Medication Dose Route Frequency    [START ON 4/14/2021] Vancomycin TROUGH level to be drawn 4/14 at 4:30 AM -- BEFORE giving the next dose due at 5:00 AM. Thanks!    Other ONCE    vancomycin (VANCOCIN) 1500 mg in  ml infusion  1,500 mg IntraVENous Q12H    ampicillin-sulbactam (UNASYN) 3 g in 0.9% sodium chloride (MBP/ADV) 100 mL MBP  3 g IntraVENous Q6H    lidocaine (PF) (XYLOCAINE) 10 mg/mL (1 %) injection 0.1 mL  0.1 mL SubCUTAneous PRN    lactated Ringers infusion  25 mL/hr IntraVENous CONTINUOUS    sodium chloride (NS) flush 5-40 mL  5-40 mL IntraVENous Q8H    sodium chloride (NS) flush 5-40 mL  5-40 mL IntraVENous PRN    amLODIPine (NORVASC) tablet 10 mg  10 mg Oral DAILY    [Held by provider] aspirin delayed-release tablet 81 mg  81 mg Oral DAILY    losartan (COZAAR) tablet 100 mg  100 mg Oral DAILY    pantoprazole (PROTONIX) tablet 20 mg  20 mg Oral DAILY    sodium chloride (NS) flush 5-40 mL  5-40 mL IntraVENous Q8H    sodium chloride (NS) flush 5-40 mL  5-40 mL IntraVENous PRN    acetaminophen (TYLENOL) tablet 650 mg  650 mg Oral Q6H PRN    Or    acetaminophen (TYLENOL) suppository 650 mg  650 mg Rectal Q6H PRN    polyethylene glycol (MIRALAX) packet 17 g  17 g Oral DAILY PRN    promethazine (PHENERGAN) tablet 12.5 mg  12.5 mg Oral Q6H PRN    Or    ondansetron (ZOFRAN) injection 4 mg  4 mg IntraVENous Q6H PRN    [Held by provider] enoxaparin (LOVENOX) injection 40 mg  40 mg SubCUTAneous DAILY    atorvastatin (LIPITOR) tablet 10 mg  10 mg Oral QHS    ibuprofen (MOTRIN) tablet 600 mg  600 mg Oral Q6H PRN    HYDROcodone-acetaminophen (NORCO) 5-325 mg per tablet 1 Tab  1 Tab Oral Q4H PRN       Allergies: Patient has no known allergies. Temp (24hrs), Av.9 °F (36.6 °C), Min:96.9 °F (36.1 °C), Max:99.1 °F (37.3 °C)    Visit Vitals  BP (!) 172/99   Pulse (!) 107   Temp 99.1 °F (37.3 °C)   Resp 20   Ht 5' 7\" (1.702 m)   Wt 86.2 kg (190 lb)   SpO2 96%   BMI 29.76 kg/m²       ROS: 12 point ROS obtained in details. Pertinent positives as mentioned in HPI,   otherwise negative    Physical Exam:    General: Well developed, well nourished male sitting on the  bed AAOx3 in no acute distress. General:   awake alert and oriented   HEENT:  Normocephalic, atraumatic,  EOMI, no scleral icterus or pallor; no conjunctival hemmohage;  nasal and oral mucous are moist and without evidence of lesions. No thrush. Filled dental cavities. No gingivitis. Surgical changes posterior neck   Lymph Nodes:   no cervical, axillary or inguinal adenopathy   Lungs:   non-labored, bilateral chest movements equal   Heart:  RRR, s1 and s2; no rubs or gallops, no edema   Abdomen:  soft, non-distended, active bowel sounds, no hepatomegaly, no splenomegaly. Non-tender   Genitourinary:  deferred   Extremities:   no clubbing, cyanosis; no joint effusions or swelling; Full ROM of all large joints to the upper and lower extremities; muscle mass appropriate for age   Neurologic:  No gross focal sensory abnormalities; 5/5 muscle strength to upper and lower extremities. Speech appropriate.  Cranial nerves intact                        Skin:   Surgical changes posterior neck   Back:  no spinal or paraspinal muscle tenderness or rigidity, no CVA tenderness     Psychiatric:  No suicidal or homicidal ideations, appropriate mood and affect         Labs: Results:   Chemistry Recent Labs     21  0215 21  1525 21  0145   GLU 86 117* 134*    139 141   K 3.7 3.9 3.4*    105 106   CO2 23 29 28   BUN 14 15 14   CREA 0.72 0.94 0.76   CA 8.2* 8.5 7.7*   AGAP 10 5 7   BUCR 19 16 18      CBC w/Diff Recent Labs 04/13/21  0215 04/12/21  0145 04/11/21  1510   WBC 27.0* 24.6* 28.3*   RBC 4.61 4.72 5.03   HGB 13.9 14.2 15.0   HCT 41.7 41.9 44.3    237 239   GRANS 82* 85* 86*   LYMPH 4* 4* 5*   EOS 0 1 0      Microbiology Recent Labs     04/11/21  2144 04/11/21  1642 04/11/21  1630   CULT PENDING  PENDING NO GROWTH 2 DAYS NO GROWTH 2 DAYS          RADIOLOGY:    All available imaging studies/reports in [facility] for this admission were reviewed    High complexity decision making was performed during the evaluation of this patient          Disclaimer: Sections of this note are dictated utilizing voice recognition software, which may have resulted in some phonetic based errors in grammar and contents. Even though attempts were made to correct all the mistakes, some may have been missed, and remained in the body of the document. If questions arise, please contact our department.     Dr. Marilyn Up, Infectious Disease Specialist  [phone]  April 13, 2021  10:01 AM

## 2021-04-13 NOTE — PROGRESS NOTES
Patient seen and examined. He is stating that he still have the swelling around his neck and it feels like a tight. His WBC has increased today to 27,000. He has no fever or tachycardia. We will do debridement of necrotic soft tissue infection of the neck and possible drainage of any collection/abscess. I also explained to the patient as I have spoke with the medicine team yesterday that he may need to be transferred to another facility for further treatment. Were trying to get an ENT surgeon involved but the patient may need significant wound care after the surgery because of the necrotic soft tissue and the possibility of having a large wound with packing. The patient understand the procedure and he consented for the surgery.

## 2021-04-13 NOTE — BRIEF OP NOTE
Brief Postoperative Note    Patient: Fay Figueroa  YOB: 1960  MRN: [id number]    Date of Procedure: 4/13/2021     Pre-Op Diagnosis: Necrotizing soft tissue infection of the neck    Post-Op Diagnosis: Same    Procedure(s):  Debridement of necrotizing soft tissue of the neck  Drainage of fluid/abscess collections    Surgeon(s):  Michael Kearney MD    Surgical Assistant: Surg Asst-1: Levell Labrum    Anesthesia: General     Estimated Blood Loss (mL): Minimal    Complications: None    Specimens:   ID Type Source Tests Collected by Time Destination   1 : neck abcess Preservative Neck  Michael Kearney MD 4/13/2021 1116 Pathology   1 : culture of posterior neck abcess Wound Neck CULTURE, ANAEROBIC, CULTURE, WOUND W Tiffany Arellano MD 4/13/2021 1110 Microbiology        Implants: * No implants in log *    Drains: * No LDAs found *    Findings: Soft tissue necrosis of the skin and subcutaneous tissue over the posterior neck with fluids collections    Electronically Signed by Greg Mckeon MD on 4/13/2021 at 11:22 AM

## 2021-04-13 NOTE — PERIOP NOTES
Assumed care of pt from OR via bed. Attached to monitor. VSS. OR, MAR and anesthesia report appreciated. Will cont to monitor. 1224  TRANSFER - OUT REPORT:    Verbal report given to [facility] PACO JUDGE (name) on Claribel Felix  being transferred to [address] [facility] (unit) for routine post - op       Report consisted of patients Situation, Background, Assessment and   Recommendations(SBAR). Information from the following report(s) SBAR, OR Summary, MAR and Cardiac Rhythm sinus rhythm was reviewed with the receiving nurse. Lines:   Peripheral IV 04/11/21 Left Antecubital (Active)   Site Assessment Clean, dry, & intact 04/13/21 1130   Phlebitis Assessment 0 04/13/21 1130   Infiltration Assessment 0 04/13/21 1130   Dressing Status Clean, dry, & intact 04/13/21 1130   Dressing Type Transparent;Tape 04/13/21 1130   Hub Color/Line Status Pink; Infusing 04/13/21 1130   Action Taken Open ports on tubing capped 04/13/21 0035   Alcohol Cap Used Yes 04/13/21 0035        Opportunity for questions and clarification was provided.       Patient transported with:   "[employer]

## 2021-04-13 NOTE — PROGRESS NOTES
I just finished debridement of soft tissue infection of the posterior neck. There was multiple area of necrosis of the skin and subcutaneous tissue that were excised as well as fluid collections. It is concerning for necrotizing fasciitis versus very severe soft tissue infection and cellulitis.    We will continue with the IV antibiotics and close observation and the patient may need more debridement depending on how he does in the next 2 to 3 days  Continue with the wound care nurse consultation for wet-to-dry dressing change at least 3 times a day

## 2021-04-13 NOTE — PROGRESS NOTES
Problem: Falls - Risk of  Goal: *Absence of Falls  Description: Document Lear Shaker Fall Risk and appropriate interventions in the flowsheet.   Outcome: Progressing Towards Goal  Note: Fall Risk Interventions:            Medication Interventions: Teach patient to arise slowly, Patient to call before getting OOB                   Problem: Pain  Goal: *Control of Pain  Outcome: Progressing Towards Goal     Problem: Cellulitis Care Plan (Adult)  Goal: *Control of acute pain  Outcome: Progressing Towards Goal  Goal: *Skin integrity maintained  Outcome: Progressing Towards Goal  Goal: *Absence of infection signs and symptoms  Outcome: Progressing Towards Goal     Problem: Patient Education: Go to Patient Education Activity  Goal: Patient/Family Education  Outcome: Resolved/Met     Problem: Patient Education: Go to Patient Education Activity  Goal: Patient/Family Education  Outcome: Resolved/Met     Problem: Patient Education: Go to Patient Education Activity  Goal: Patient/Family Education  Outcome: Resolved/Met

## 2021-04-13 NOTE — PROGRESS NOTES
Problem: Falls - Risk of  Goal: *Absence of Falls  Description: Document Brayan Berg Fall Risk and appropriate interventions in the flowsheet.   Note: Fall Risk Interventions:            Medication Interventions: Patient to call before getting OOB                   Problem: Pain  Goal: *Control of Pain  Outcome: Progressing Towards Goal

## 2021-04-13 NOTE — PROGRESS NOTES
[facility]  Progress Note    Patient: Marlo Abbott MRN: [id number]   SSN: [id number]  YOB: 1960   Age: 56 y.o. Sex: male      Admit Date: 4/11/2021    LOS: 2 days   Chief Complaint   Patient presents with    Skin Infection       Subjective:     Patient well on PM exam last night. This AM, patient complains of continued headache and pain from posterior neck cellulitis. Pain relief with regimen of Norco, tylenol and motrin. General surgery to take patient to OR for debridement of necrotic tissue. Review of Systems   HENT: Negative for ear pain and hearing loss. Eyes: Negative for blurred vision. Respiratory: Negative for cough, hemoptysis, shortness of breath and wheezing. Cardiovascular: Negative for chest pain, palpitations and leg swelling. Gastrointestinal: Negative for abdominal pain, nausea and vomiting. Musculoskeletal: Positive for neck pain. Neurological: Positive for headaches. Negative for dizziness. Objective:     Patient Vitals for the past 24 hrs:   Temp Pulse Resp BP SpO2   04/13/21 1026 99.1 °F (37.3 °C) (!) 104 20 (!) 158/94 96 %   04/13/21 0759 99.1 °F (37.3 °C) (!) 107 20 (!) 172/99 96 %   04/13/21 0356 98.2 °F (36.8 °C) 93 18 (!) 163/95 95 %   04/12/21 1928 96.9 °F (36.1 °C) 94 20 (!) 146/89 96 %   04/12/21 1715 97.6 °F (36.4 °C) 92 20 (!) 151/90 97 %   04/12/21 1525 97.5 °F (36.4 °C) 89 20 (!) 142/89 97 %   04/12/21 1138 98.2 °F (36.8 °C) (!) 104 20 (!) 153/97 98 %     Intake and Output:  Current Shift: 04/13 0701 - 04/13 1900  In: 30 [P.O.:30]  Out: 800 [Urine:800]  Last three shifts: 04/11 1901 - 04/13 0700  In: 2248.8 [P.O.:1320; I.V.:928.8]  Out: 1950 [Urine:1950]    Physical Exam  Constitutional:       General: He is not in acute distress. Appearance: He is normal weight. He is not ill-appearing, toxic-appearing or diaphoretic. Eyes:      General: No scleral icterus.      Conjunctiva/sclera: Conjunctivae normal.   Neck: Musculoskeletal: Normal range of motion. Muscular tenderness present. Cardiovascular:      Rate and Rhythm: Normal rate and regular rhythm. Pulses: Normal pulses. Heart sounds: Normal heart sounds. No murmur. No friction rub. No gallop. Pulmonary:      Effort: Pulmonary effort is normal. No respiratory distress. Breath sounds: Normal breath sounds. No wheezing, rhonchi or rales. Abdominal:      General: Abdomen is flat. Bowel sounds are normal. There is no distension. Palpations: Abdomen is soft. Tenderness: There is no abdominal tenderness. There is no guarding or rebound. Musculoskeletal:         General: No swelling. Right lower leg: No edema. Left lower leg: No edema. Skin:     General: Skin is warm. Capillary Refill: Capillary refill takes less than 2 seconds. Findings: Lesion and rash present. Comments: Cellulitis at posterior neck with erythematous area measuring 6x7cm with approximately 4x2cm area of induration and draining pus. Area is warm. - Right mandibular warm, erythematous and mildly swollen, swelling now extended below mandible   Neurological:      Mental Status: He is oriented to person, place, and time. Motor: No weakness.        Lab/Data Review:  Recent Results (from the past 12 hour(s))   METABOLIC PANEL, BASIC    Collection Time: 04/13/21  2:15 AM   Result Value Ref Range    Sodium 141 136 - 145 mmol/L    Potassium 3.7 3.5 - 5.5 mmol/L    Chloride 108 100 - 111 mmol/L    CO2 23 21 - 32 mmol/L    Anion gap 10 3.0 - 18 mmol/L    Glucose 86 74 - 99 mg/dL    BUN 14 7.0 - 18 MG/DL    Creatinine 0.72 0.6 - 1.3 MG/DL    BUN/Creatinine ratio 19 12 - 20      GFR est AA >60 >60 ml/min/1.73m2    GFR est non-AA >60 >60 ml/min/1.73m2    Calcium 8.2 (L) 8.5 - 10.1 MG/DL   CBC WITH AUTOMATED DIFF    Collection Time: 04/13/21  2:15 AM   Result Value Ref Range    WBC 27.0 (H) 4.6 - 13.2 K/uL    RBC 4.61 4.35 - 5.65 M/uL    HGB 13.9 13.0 - 16.0 g/dL    HCT 41.7 36.0 - 48.0 %    MCV 90.5 74.0 - 97.0 FL    MCH 30.2 24.0 - 34.0 PG    MCHC 33.3 31.0 - 37.0 g/dL    RDW 14.0 11.6 - 14.5 %    PLATELET 737 615 - 093 K/uL    MPV 10.6 9.2 - 11.8 FL    NEUTROPHILS 82 (H) 40 - 73 %    BAND NEUTROPHILS 2 %    LYMPHOCYTES 4 (L) 21 - 52 %    MONOCYTES 11 (H) 3 - 10 %    EOSINOPHILS 0 0 - 5 %    BASOPHILS 0 0 - 2 %    MYELOCYTES 1 %    ABS. NEUTROPHILS 22.7 (H) 1.8 - 8.0 K/UL    ABS. LYMPHOCYTES 1.1 0.9 - 3.6 K/UL    ABS. MONOCYTES 3.0 (H) 0.05 - 1.2 K/UL    ABS. EOSINOPHILS 0.0 0.0 - 0.4 K/UL    ABS. BASOPHILS 0.0 0.0 - 0.1 K/UL    DF MANUAL      PLATELET COMMENTS ADEQUATE PLATELETS      RBC COMMENTS NORMOCYTIC, NORMOCHROMIC     SARS-COV-2    Collection Time: 04/13/21  9:15 AM   Result Value Ref Range    SARS-CoV-2 Please find results under separate order     COVID-19 RAPID TEST    Collection Time: 04/13/21  9:15 AM   Result Value Ref Range    Specimen source Nasopharyngeal      COVID-19 rapid test Not detected NOTD         RECENT RESULTS  MODALITY IMPRESSION   XR Results from [place] encounter on 02/05/21   XR HAND RT MIN 3 V    Narrative RIGHT HAND RADIOGRAPH-3 VIEWS    INDICATION: Skin infection. COMPARISON: None    FINDINGS:  No evidence for acute fracture or dislocation. Mild degenerative change at the  first digit interphalangeal joint. Mild joint space narrowing at the first MCP  joint first Aia 16 joint. Prominent soft tissue swelling noted at the hyperthenar  aspect of the hand. Additional mild soft tissue swelling extends along the  dorsal aspect of the hand and ulnar aspect of the wrist. No evidence for  retained radiopaque foreign body. Impression 1. No acute osseous finding. 2. Soft tissue swelling as described. CT Results from [place] encounter on 04/11/21   CT MAXILLOFACIAL W CONT    Narrative EXAM:  CT Maxillofacial Study with Contrast                         INDICATION:  Facial and neck swelling.             COMPARISON: CT neck 04/08/21            TECHNIQUE:      - Helical volumetric scan of the maxillofacial structures is performed following  IV contrast (100 mL Isovue-300). Coronal and sagittal reformation images are  generated for improved anatomic delineation.  - Radiation dose optimization techniques are utilized as appropriate to the  exam, with combination of automated exposure control, adjustment of the mA  and/or kV according to patient's size (Including appropriate matching for  site-specific examinations), or use of iterative reconstruction technique. FINDINGS:          Soft Tissues:    - Skin thickening in the left facial and neck region with subcutaneous fat  stranding, most pronounced in the submandibular region. No distinct drainable  fluid collection is demonstrated in the imaged area. - Bilateral cervical musculature with edematous changes (axial #63-76). - Multiple shotty lymph nodes. The largest of the lymph nodes on the left side  measures up to about 0.9 x 0.9 cm (axial #68). The largest of the right sided  lymph nodes measures about 1.0 x 0.8 cm (axial #62). Bones:    - No acute facial bone fracture. - Left mandibular 1st molar tooth with a developing dental cavity with  periapical subtle lucency. Miscellaneous:  Incidentally included base of the brain appears unremarkable. Impression           1. Edematous changes involving the facial skin and subcutaneous tissue was  pronounced in the submandibular region. The source for the edematous changes is  unclear. 2.  Cervical musculature with apparent edematous changes around the margins of  the cervical musculature bilaterally. Again the source for these edematous  changes is unclear on current image. Correlation with CT of the chest may be  helpful for further delineation. MRI No results found for this or any previous visit.    ULTRASOUND Results from Hospital Encounter encounter on 08/08/12   US EXT NONVAS LT LTD    Impression IMPRESSION:    Soft tissue swelling. No evidence for free fluid or soft tissue mass. Cardiology Procedures/Testing:  MODALITY RESULTS   EKG No results found for this or any previous visit. ECHO       Special Testing/Procedures:  MODALITY RESULTS   MICRO All Micro Results     Procedure Component Value Units Date/Time    COVID-19 RAPID TEST [[id number]] Collected: 04/13/21 0915    Order Status: Completed Specimen: Nasopharyngeal Updated: 04/13/21 0945     Specimen source Nasopharyngeal        COVID-19 rapid test Not detected        Comment: Rapid Abbott ID Now       Rapid NAAT:  The specimen is NEGATIVE for SARS-CoV-2, the novel coronavirus associated with COVID-19. Negative results should be treated as presumptive and, if inconsistent with clinical signs and symptoms or necessary for patient management, should be tested with an alternative molecular assay. Negative results do not preclude SARS-CoV-2 infection and should not be used as the sole basis for patient management decisions. This test has been authorized by the FDA under an Emergency Use Authorization (EUA) for use by authorized laboratories.    Fact sheet for Healthcare Providers: ConventionUpdate.co.nz  Fact sheet for Patients: ConventionUpdate.co.nz       Methodology: Isothermal Nucleic Acid Amplification         CULTURE, BLOOD [[id number]] Collected: 04/11/21 1630    Order Status: Completed Specimen: Blood Updated: 04/13/21 0747     Special Requests: NO SPECIAL REQUESTS        Culture result: NO GROWTH 2 DAYS       CULTURE, BLOOD [[id number]] Collected: 04/11/21 1642    Order Status: Completed Specimen: Blood Updated: 04/13/21 0747     Special Requests: NO SPECIAL REQUESTS        Culture result: NO GROWTH 2 DAYS       CULTURE, MRSA [[id number]] Collected: 04/12/21 1142    Order Status: Completed Specimen: Nasal from Nares Updated: 04/12/21 2212    CULTURE, WOUND Vinh Ravi STAIN [[id number]] Collected: 04/11/21 2144    Order Status: Completed Specimen: Wound from Neck Updated: 04/12/21 1887     Special Requests: NO SPECIAL REQUESTS        GRAM STAIN OCCASIONAL WBCS SEEN         3+ GRAM POSITIVE COCCI        Culture result: PENDING    CULTURE, Yaniv Other STAIN [[id number]] Collected: 04/11/21 2144    Order Status: Completed Specimen: Boil Updated: 04/12/21 1554     Special Requests: R/O MRSA     GRAM STAIN OCCASIONAL WBCS SEEN         2+ GRAM POSITIVE COCCI        Culture result: PENDING    CULTURE, MRSA [[id number]] Collected: 04/11/21 1800    Order Status: Canceled Specimen: Nasal          UA Results for orders placed or performed in visit on 09/18/20   AMB POC URINALYSIS DIP STICK AUTO W/O MICRO     Status: None   Result Value Ref Range Status    Color (UA POC) Yellow  Final    Clarity (UA POC) Clear  Final    Glucose (UA POC) Negative Negative Final    Bilirubin (UA POC) Negative Negative Final    Ketones (UA POC) Trace Negative Final    Specific gravity (UA POC) 1.020 1.001 - 1.035 Final    Blood (UA POC) Negative Negative Final    pH (UA POC) 6.5 4.6 - 8.0 Final    Protein (UA POC) 1+ Negative Final    Urobilinogen (UA POC) 0.2 mg/dL 0.2 - 1 Final    Nitrites (UA POC) Negative Negative Final    Leukocyte esterase (UA POC) Negative Negative Final        Telemetry NONE   Oxygen NONE     Assessment and Plan:     56 y.o. male with PMH HTN, HLD and GERD, now admitted with cellulitis of neck.     Sepsis 2/2 to cellulitis at neck: Cellulitis at back of neck with outpatient treatment failure. Patient seen in the ED and discharged home with clindamycin with no resolution of symptoms. DDx include cellulitis vs. Abscess. Recurrent cellulitis most likely secondary to Hydradenitis suppurativa. Patient has been afebrile, hypertensive to 146/94 with mild tachycardia to 103. Labs significant for leukocytosis (WBC 28.3) with left shift, lactic acid wnl.  Meets 2/4 SIRS criteria (tachycardia and leukocytosis). CT Neck (4/8) showed Soft tissue swelling throughout the cutaneous and subcutaneous posterior neck (cellulitis w/o abscess), Left mandibular first molar tooth dental and periodontal disease  · CT maxillofacial with contrast (4/12): Edematous changes involving the facial skin and subcutaneous tissue was pronounced in the submandibular region. Cervical musculature with apparent edematous changes around the margins of the cervical musculature bilaterally. Source for the edematous changes is unclear. - Continue on medicine inpatient floor  - Continue Vancomycin 1500mg q12h  - FU blood culture, wound culture, MRSA nasal swab  - Pain control - tylenol and ibuprofen prn, Norco prn  - ID consulted, appreciate recs  - wound care consulted, appreciate recs  - warm compress  - Daily labs: CBC, BMP  - VSS as per protocol  - I&D today (4/13) by gen.  Surgery, NPO overnight, held Lovenox/ASA, Covid rapid negative       HTN: BP on admission 146/94  - Continue home Amlodipine 10mg qD and Losartan 10mg qD     Hyperkalemia, resolved: K+ 3.3 on admission, today K 3.9  - replete as per protocol  - repeat BMP     HLD: Continue home Lipitor 10mg qHS and ASA 81mg qD  - ASA held for I&D     GERD: Omeprazole 20mg qD at home  - substitute with Protonix 20mg qD     [facility]:  - PT/OT   - case management   Diet NPO   DVT Prophylaxis Held Lovenox for I&D   Code status Full   Disposition  <2nights      Point of Contact Song Rhodes  Relationship:Friend  [phone]     Lina Diaz  Relationship:Friend  [phone]       Naresh Godinez MD, PGY1   [address]   Intern Pager: [phone]   April 13, 2021, 10:46 AM

## 2021-04-13 NOTE — ROUTINE PROCESS
End of Shift Note Bedside and verbal shift change report given to Karl Suarez (On coming nurse) by Karolina Langley RN (Off going nurse). Report included the following information:  
   --Procedure Summary 
   --MAR, 
   --Recent Results --Med Rec Status

## 2021-04-14 LAB
ANION GAP SERPL CALC-SCNC: 5 MMOL/L (ref 3–18)
BACTERIA SPEC CULT: ABNORMAL
BACTERIA SPEC CULT: ABNORMAL
BASOPHILS # BLD: 0 K/UL (ref 0–0.1)
BASOPHILS NFR BLD: 0 % (ref 0–2)
BUN SERPL-MCNC: 19 MG/DL (ref 7–18)
BUN/CREAT SERPL: 20 (ref 12–20)
CALCIUM SERPL-MCNC: 8.5 MG/DL (ref 8.5–10.1)
CHLORIDE SERPL-SCNC: 107 MMOL/L (ref 100–111)
CO2 SERPL-SCNC: 28 MMOL/L (ref 21–32)
CREAT SERPL-MCNC: 0.94 MG/DL (ref 0.6–1.3)
DATE LAST DOSE: NORMAL
DIFFERENTIAL METHOD BLD: ABNORMAL
EOSINOPHIL # BLD: 0 K/UL (ref 0–0.4)
EOSINOPHIL NFR BLD: 0 % (ref 0–5)
ERYTHROCYTE [DISTWIDTH] IN BLOOD BY AUTOMATED COUNT: 13.7 % (ref 11.6–14.5)
GLUCOSE SERPL-MCNC: 143 MG/DL (ref 74–99)
GRAM STN SPEC: ABNORMAL
HCT VFR BLD AUTO: 38.3 % (ref 36–48)
HGB BLD-MCNC: 13.1 G/DL (ref 13–16)
LYMPHOCYTES # BLD: 0.3 K/UL (ref 0.9–3.6)
LYMPHOCYTES NFR BLD: 1 % (ref 21–52)
MCH RBC QN AUTO: 30.3 PG (ref 24–34)
MCHC RBC AUTO-ENTMCNC: 34.2 G/DL (ref 31–37)
MCV RBC AUTO: 88.5 FL (ref 74–97)
MONOCYTES # BLD: 0.6 K/UL (ref 0.05–1.2)
MONOCYTES NFR BLD: 2 % (ref 3–10)
NEUTS SEG # BLD: 29.3 K/UL (ref 1.8–8)
NEUTS SEG NFR BLD: 97 % (ref 40–73)
PLATELET # BLD AUTO: 306 K/UL (ref 135–420)
PLATELET COMMENTS,PCOM: ABNORMAL
PMV BLD AUTO: 9.4 FL (ref 9.2–11.8)
POTASSIUM SERPL-SCNC: 3.7 MMOL/L (ref 3.5–5.5)
RBC # BLD AUTO: 4.33 M/UL (ref 4.35–5.65)
RBC MORPH BLD: ABNORMAL
REPORTED DOSE,DOSE: NORMAL UNITS
REPORTED DOSE/TIME,TMG: 1700
SERVICE CMNT-IMP: ABNORMAL
SERVICE CMNT-IMP: ABNORMAL
SODIUM SERPL-SCNC: 140 MMOL/L (ref 136–145)
VANCOMYCIN TROUGH SERPL-MCNC: 12.5 UG/ML (ref 10–20)
WBC # BLD AUTO: 30.2 K/UL (ref 4.6–13.2)

## 2021-04-14 PROCEDURE — 85025 COMPLETE CBC W/AUTO DIFF WBC: CPT

## 2021-04-14 PROCEDURE — 74011250636 HC RX REV CODE- 250/636: Performed by: SURGERY

## 2021-04-14 PROCEDURE — 77030033175 HC DRSG CLNS VAC VERALFO KCON -C

## 2021-04-14 PROCEDURE — 65270000029 HC RM PRIVATE

## 2021-04-14 PROCEDURE — 77030033174

## 2021-04-14 PROCEDURE — 97605 NEG PRS WND THER DME<=50SQCM: CPT

## 2021-04-14 PROCEDURE — 74011250636 HC RX REV CODE- 250/636: Performed by: FAMILY MEDICINE

## 2021-04-14 PROCEDURE — 36415 COLL VENOUS BLD VENIPUNCTURE: CPT

## 2021-04-14 PROCEDURE — 80202 ASSAY OF VANCOMYCIN: CPT

## 2021-04-14 PROCEDURE — 80048 BASIC METABOLIC PNL TOTAL CA: CPT

## 2021-04-14 PROCEDURE — 74011250636 HC RX REV CODE- 250/636: Performed by: INTERNAL MEDICINE

## 2021-04-14 PROCEDURE — 74011250637 HC RX REV CODE- 250/637: Performed by: SURGERY

## 2021-04-14 PROCEDURE — 74011250637 HC RX REV CODE- 250/637: Performed by: FAMILY MEDICINE

## 2021-04-14 PROCEDURE — 74011000258 HC RX REV CODE- 258: Performed by: SURGERY

## 2021-04-14 PROCEDURE — 2709999900 HC NON-CHARGEABLE SUPPLY

## 2021-04-14 RX ORDER — VANCOMYCIN 1.75 GRAM/500 ML IN 0.9 % SODIUM CHLORIDE INTRAVENOUS
1750 EVERY 12 HOURS
Status: DISCONTINUED | OUTPATIENT
Start: 2021-04-14 | End: 2021-04-16 | Stop reason: HOSPADM

## 2021-04-14 RX ADMIN — ENOXAPARIN SODIUM 40 MG: 40 INJECTION SUBCUTANEOUS at 09:51

## 2021-04-14 RX ADMIN — VANCOMYCIN HYDROCHLORIDE 1500 MG: 10 INJECTION, POWDER, LYOPHILIZED, FOR SOLUTION INTRAVENOUS at 05:54

## 2021-04-14 RX ADMIN — SODIUM CHLORIDE 3 G: 900 INJECTION INTRAVENOUS at 21:44

## 2021-04-14 RX ADMIN — Medication 10 ML: at 05:56

## 2021-04-14 RX ADMIN — Medication 20 ML: at 05:56

## 2021-04-14 RX ADMIN — PANTOPRAZOLE SODIUM 20 MG: 20 TABLET, DELAYED RELEASE ORAL at 09:51

## 2021-04-14 RX ADMIN — VANCOMYCIN HYDROCHLORIDE 1750 MG: 10 INJECTION, POWDER, LYOPHILIZED, FOR SOLUTION INTRAVENOUS at 16:15

## 2021-04-14 RX ADMIN — SODIUM CHLORIDE 3 G: 900 INJECTION INTRAVENOUS at 03:27

## 2021-04-14 RX ADMIN — SODIUM CHLORIDE 3 G: 900 INJECTION INTRAVENOUS at 13:00

## 2021-04-14 RX ADMIN — Medication 81 MG: at 09:51

## 2021-04-14 RX ADMIN — LOSARTAN POTASSIUM 100 MG: 50 TABLET, FILM COATED ORAL at 09:51

## 2021-04-14 RX ADMIN — ATORVASTATIN CALCIUM 10 MG: 10 TABLET, FILM COATED ORAL at 21:44

## 2021-04-14 RX ADMIN — AMLODIPINE BESYLATE 10 MG: 10 TABLET ORAL at 09:51

## 2021-04-14 NOTE — OP NOTES
[facility]  OPERATIVE REPORT    Name:  Byron Dan  MR#:   [id number]  :  1960  ACCOUNT #:  [de-identified]  DATE OF SERVICE:  2021    PREOPERATIVE DIAGNOSIS:  Soft tissue infection of the neck with severe cellulitis. POSTOPERATIVE DIAGNOSIS:  Soft tissue infection of the neck with severe cellulitis. 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 SURGEON:  Gagan Porras. Meenakshi Mercedes MD.    ASSISTANT:  Effie Dupree. ANESTHESIA:  General.    COMPLICATIONS:  None. SPECIMENS REMOVED:  1. Fluid for culture. 2.  Soft tissue for pathology. IMPLANTS:  None. ESTIMATED BLOOD LOSS:  Minimal.    DETAILS OF PROCEDURE:  The patient was brought to the operating room. Anesthesia was induced. Timeout was performed. Scrubbing and draping of the neck were done in the usual manner after placing the patient in prone position. At this point, there was necrotic tissue in the posterior mid neck in a transverse fashion. A cautery was used to excise the necrotic tissue from the skin and subcutaneous tissue. When we entered the subcutaneous tissue, there was no collection of pus, but there were some fluids that were yellow to whitish and thick in the subcutaneous tissue in multiple areas, but not in one location, so I had to do multiple drainage by passing mosquito at the beginning slowly to make sure to be able to go toward the scalp superiorly and toward the upper back inferiorly. We drained all of the fluids that we were able to after placing pressure on the edges of the wounds. Then, the necrotic soft tissue of the skin and subcutaneous tissue were excised with electrocautery and sent for permanent pathology as well.   At that point, the surrounding tissue looked relatively healthier and the induration had decreased, so the area was irrigated multiple times and then Kerlix and 4x4 were placed.         Nano Jones MD      [id number]  D:  04/13/2021 11:32  T:  04/13/2021 21:26  JOB #:  [id number]

## 2021-04-14 NOTE — PROGRESS NOTES
Received pt alert and oriented and in pleasant spirits. Dressing to back of neck with breakthrough drainage. Linen changed. Denies any pain or discomfort at this time. 2115 ENT  In to assess pt.     63 MD in with pt. Asked this nurse to change his neck dressing. Old dressing removed . Serosanguinous drainage on bandage. Open wound to posterior neck cleaned and packed with moist gauze and covered with ABD. Pt tolerated procedure well. Still denies pain. Comfort measures provided. 0330 dressing to neck clean, dry and intact.  Pt denies any pain    0730 report given to Matthew Kraft with SBAR and Kardex

## 2021-04-14 NOTE — PROGRESS NOTES
[facility]  Progress Note    Patient: Claribel Felix MRN: [id number]   SSN: [id number]  YOB: 1960   Age: 56 y.o. Sex: male      Admit Date: 4/11/2021    LOS: 3 days   Chief Complaint   Patient presents with    Skin Infection       Subjective:     Patient well on PM exam last night. This AM, patient complains has no complaints re: pain. Patient inquired re: discharge planning as he needs to plan work commitments. He also is currently having problems with tenants in his house. Review of Systems   HENT: Negative for ear pain and hearing loss. Eyes: Negative for blurred vision. Respiratory: Negative for cough, hemoptysis, shortness of breath and wheezing. Cardiovascular: Negative for chest pain, palpitations and leg swelling. Gastrointestinal: Negative for abdominal pain, nausea and vomiting. Musculoskeletal: Negative for neck pain. Neurological: Negative for dizziness and headaches. Objective:     Patient Vitals for the past 24 hrs:   Temp Pulse Resp BP SpO2   04/14/21 1214 97.5 °F (36.4 °C) 79 16 131/79 97 %   04/14/21 0851 97.6 °F (36.4 °C) 97 16 (!) 142/90 98 %   04/14/21 0330 97.3 °F (36.3 °C) 78 16 134/84 95 %   04/13/21 2305 99 °F (37.2 °C) 92 18 128/76 96 %   04/13/21 1948 98.4 °F (36.9 °C) 91 18 (!) 145/91 97 %   04/13/21 1532 98.5 °F (36.9 °C) 97 18 137/84 95 %     Intake and Output:  Current Shift: No intake/output data recorded. Last three shifts: 04/12 1901 - 04/14 0700  In: 2198.8 [P.O.:870; I.V.:1328.8]  Out: 3650 [Urine:3650]    Physical Exam  Constitutional:       General: He is not in acute distress. Appearance: He is normal weight. He is not ill-appearing, toxic-appearing or diaphoretic. Eyes:      General: No scleral icterus. Conjunctiva/sclera: Conjunctivae normal.   Neck:      Musculoskeletal: Normal range of motion. Muscular tenderness present. Cardiovascular:      Rate and Rhythm: Normal rate and regular rhythm.       Pulses: Normal pulses. Heart sounds: Normal heart sounds. No murmur. No friction rub. No gallop. Pulmonary:      Effort: Pulmonary effort is normal. No respiratory distress. Breath sounds: Normal breath sounds. No wheezing, rhonchi or rales. Abdominal:      General: Abdomen is flat. Bowel sounds are normal. There is no distension. Palpations: Abdomen is soft. Tenderness: There is no abdominal tenderness. There is no guarding or rebound. Musculoskeletal:         General: No swelling. Right lower leg: No edema. Left lower leg: No edema. Skin:     General: Skin is warm. Capillary Refill: Capillary refill takes less than 2 seconds. Findings: Lesion and rash present. Comments: Cellulitis at posterior neck debrided with bandage overlying.  - Right mandibular warm, erythematous and mildly swollen, swelling extended below mandible improved from previous day   Neurological:      Mental Status: He is oriented to person, place, and time. Motor: No weakness.        Lab/Data Review:  Recent Results (from the past 12 hour(s))   METABOLIC PANEL, BASIC    Collection Time: 04/14/21  4:33 AM   Result Value Ref Range    Sodium 140 136 - 145 mmol/L    Potassium 3.7 3.5 - 5.5 mmol/L    Chloride 107 100 - 111 mmol/L    CO2 28 21 - 32 mmol/L    Anion gap 5 3.0 - 18 mmol/L    Glucose 143 (H) 74 - 99 mg/dL    BUN 19 (H) 7.0 - 18 MG/DL    Creatinine 0.94 0.6 - 1.3 MG/DL    BUN/Creatinine ratio 20 12 - 20      GFR est AA >60 >60 ml/min/1.73m2    GFR est non-AA >60 >60 ml/min/1.73m2    Calcium 8.5 8.5 - 10.1 MG/DL   CBC WITH AUTOMATED DIFF    Collection Time: 04/14/21  4:33 AM   Result Value Ref Range    WBC 30.2 (H) 4.6 - 13.2 K/uL    RBC 4.33 (L) 4.35 - 5.65 M/uL    HGB 13.1 13.0 - 16.0 g/dL    HCT 38.3 36.0 - 48.0 %    MCV 88.5 74.0 - 97.0 FL    MCH 30.3 24.0 - 34.0 PG    MCHC 34.2 31.0 - 37.0 g/dL    RDW 13.7 11.6 - 14.5 %    PLATELET 346 251 - 396 K/uL    MPV 9.4 9.2 - 11.8 FL NEUTROPHILS 97 (H) 40 - 73 %    LYMPHOCYTES 1 (L) 21 - 52 %    MONOCYTES 2 (L) 3 - 10 %    EOSINOPHILS 0 0 - 5 %    BASOPHILS 0 0 - 2 %    ABS. NEUTROPHILS 29.3 (H) 1.8 - 8.0 K/UL    ABS. LYMPHOCYTES 0.3 (L) 0.9 - 3.6 K/UL    ABS. MONOCYTES 0.6 0.05 - 1.2 K/UL    ABS. EOSINOPHILS 0.0 0.0 - 0.4 K/UL    ABS. BASOPHILS 0.0 0.0 - 0.1 K/UL    DF MANUAL      PLATELET COMMENTS ADEQUATE PLATELETS      RBC COMMENTS NORMOCYTIC, NORMOCHROMIC     VANCOMYCIN, TROUGH    Collection Time: 04/14/21  4:33 AM   Result Value Ref Range    Vancomycin,trough 12.5 10.0 - 20.0 ug/mL    Reported dose date [id number]      Reported dose time: 1700      Reported dose: 1500 MG UNITS       RECENT RESULTS  MODALITY IMPRESSION   XR Results from Hospital Encounter encounter on 02/05/21   XR HAND RT MIN 3 V    Narrative RIGHT HAND RADIOGRAPH-3 VIEWS    INDICATION: Skin infection. COMPARISON: None    FINDINGS:  No evidence for acute fracture or dislocation. Mild degenerative change at the  first digit interphalangeal joint. Mild joint space narrowing at the first MCP  joint first [facility] joint. Prominent soft tissue swelling noted at the hyperthenar  aspect of the hand. Additional mild soft tissue swelling extends along the  dorsal aspect of the hand and ulnar aspect of the wrist. No evidence for  retained radiopaque foreign body. Impression 1. No acute osseous finding. 2. Soft tissue swelling as described. CT Results from [place] encounter on 04/11/21   CT MAXILLOFACIAL W CONT    Narrative EXAM:  CT Maxillofacial Study with Contrast                         INDICATION:  Facial and neck swelling. COMPARISON:  CT neck 04/08/21            TECHNIQUE:      - Helical volumetric scan of the maxillofacial structures is performed following  IV contrast (100 mL [id number]-300).  Coronal and sagittal reformation images are  generated for improved anatomic delineation.  - Radiation dose optimization techniques are utilized as appropriate to the  exam, with combination of automated exposure control, adjustment of the mA  and/or kV according to patient's size (Including appropriate matching for  site-specific examinations), or use of iterative reconstruction technique. FINDINGS:          Soft Tissues:    - Skin thickening in the left facial and neck region with subcutaneous fat  stranding, most pronounced in the submandibular region. No distinct drainable  fluid collection is demonstrated in the imaged area. - Bilateral cervical musculature with edematous changes (axial #63-76). - Multiple shotty lymph nodes. The largest of the lymph nodes on the left side  measures up to about 0.9 x 0.9 cm (axial #68). The largest of the right sided  lymph nodes measures about 1.0 x 0.8 cm (axial #62). Bones:    - No acute facial bone fracture. - Left mandibular 1st molar tooth with a developing dental cavity with  periapical subtle lucency. Miscellaneous:  Incidentally included base of the brain appears unremarkable. Impression           1. Edematous changes involving the facial skin and subcutaneous tissue was  pronounced in the submandibular region. The source for the edematous changes is  unclear. 2.  Cervical musculature with apparent edematous changes around the margins of  the cervical musculature bilaterally. Again the source for these edematous  changes is unclear on current image. Correlation with CT of the chest may be  helpful for further delineation. MRI No results found for this or any previous visit. ULTRASOUND Results from [place] encounter on 08/08/12   US EXT NONVAS LT LTD    Impression IMPRESSION:    Soft tissue swelling. No evidence for free fluid or soft tissue mass. Cardiology Procedures/Testing:  MODALITY RESULTS   EKG No results found for this or any previous visit.     ECHO       Special Testing/Procedures:  MODALITY RESULTS   MICRO All Micro Results Procedure Component Value Units Date/Time    CULTURE, Amy Bryant STAIN [[id number]]  (Abnormal)  (Susceptibility) Collected: 04/11/21 2144    Order Status: Completed Specimen: Wound from Neck Updated: 04/14/21 1109     Special Requests: NO SPECIAL REQUESTS        GRAM STAIN OCCASIONAL WBCS SEEN         3+ GRAM POSITIVE COCCI        Culture result:       HEAVY * METHICILLIN RESISTANT STAPHYLOCOCCUS AUREUS *          CULTURE, WOUND Thereasa Gravely STAIN [[id number]]  (Abnormal)  (Susceptibility) Collected: 04/11/21 2144    Order Status: Completed Specimen: Boil Updated: 04/14/21 1107     Special Requests: R/O MRSA     GRAM STAIN OCCASIONAL WBCS SEEN         2+ GRAM POSITIVE COCCI        Culture result:       HEAVY * METHICILLIN RESISTANT STAPHYLOCOCCUS AUREUS *          CULTURE, BLOOD [[id number]] Collected: 04/11/21 1630    Order Status: Completed Specimen: Blood Updated: 04/14/21 0726     Special Requests: NO SPECIAL REQUESTS        Culture result: NO GROWTH 3 DAYS       CULTURE, BLOOD [[id number]] Collected: 04/11/21 1642    Order Status: Completed Specimen: Blood Updated: 04/14/21 0726     Special Requests: NO SPECIAL REQUESTS        Culture result: NO GROWTH 3 DAYS       CULTURE, ANAEROBIC [[id number]] Collected: 04/13/21 1110    Order Status: Completed Specimen: Wound Drainage Updated: 04/13/21 2310    CULTURE, MRSA [[id number]]  (Abnormal) Collected: 04/12/21 1142    Order Status: Completed Specimen: Nasal from Nares Updated: 04/13/21 1259     Special Requests: NO SPECIAL REQUESTS        Culture result: MRSA PRESENT               Screening of patient nares for MRSA is for surveillance purposes and, if positive, to facilitate isolation considerations in high risk settings. It is not intended for automatic decolonization interventions per se as regimens are not sufficiently effective to warrant routine use.           CULTURE, TISSUE Malvin Pavon [[id number]] Collected: 04/13/21 1110    Order Status: Canceled Specimen: Neck COVID-19 RAPID TEST [[id number]] Collected: 04/13/21 0915    Order Status: Completed Specimen: Nasopharyngeal Updated: 04/13/21 0945     Specimen source Nasopharyngeal        COVID-19 rapid test Not detected        Comment: Rapid Abbott ID Now       Rapid NAAT:  The specimen is NEGATIVE for SARS-CoV-2, the novel coronavirus associated with COVID-19. Negative results should be treated as presumptive and, if inconsistent with clinical signs and symptoms or necessary for patient management, should be tested with an alternative molecular assay. Negative results do not preclude SARS-CoV-2 infection and should not be used as the sole basis for patient management decisions. This test has been authorized by the FDA under an Emergency Use Authorization (EUA) for use by authorized laboratories.    Fact sheet for Healthcare Providers: ConventionUpdate.co.nz  Fact sheet for Patients: ConventionUpdate.co.nz       Methodology: Isothermal Nucleic Acid Amplification         CULTURE, MRSA [[id number]] Collected: 04/11/21 1800    Order Status: Canceled Specimen: Nasal          UA Results for orders placed or performed in visit on 09/18/20   AMB POC URINALYSIS DIP STICK AUTO W/O MICRO     Status: None   Result Value Ref Range Status    Color (UA POC) Yellow  Final    Clarity (UA POC) Clear  Final    Glucose (UA POC) Negative Negative Final    Bilirubin (UA POC) Negative Negative Final    Ketones (UA POC) Trace Negative Final    Specific gravity (UA POC) 1.020 1.001 - 1.035 Final    Blood (UA POC) Negative Negative Final    pH (UA POC) 6.5 4.6 - 8.0 Final    Protein (UA POC) 1+ Negative Final    Urobilinogen (UA POC) 0.2 mg/dL 0.2 - 1 Final    Nitrites (UA POC) Negative Negative Final    Leukocyte esterase (UA POC) Negative Negative Final        Telemetry NONE   Oxygen NONE     Assessment and Plan:     56 y.o. male with PMH HTN, HLD and GERD, now admitted with cellulitis of neck.     Sepsis 2/2 to cellulitis at neck: Cellulitis at back of neck with outpatient treatment failure. Patient seen in the ED and discharged home with clindamycin with no resolution of symptoms. DDx include cellulitis vs. Abscess. Recurrent cellulitis most likely secondary to Hydradenitis suppurativa. Patient has been afebrile, hypertensive to 146/94 with mild tachycardia to 103. Labs significant for leukocytosis (WBC 28.3) with left shift, lactic acid wnl. Meets 2/4 SIRS criteria (tachycardia and leukocytosis). CT Neck (4/8) showed Soft tissue swelling throughout the cutaneous and subcutaneous posterior neck (cellulitis w/o abscess), Left mandibular first molar tooth dental and periodontal disease  · CT maxillofacial with contrast (4/12): Edematous changes involving the facial skin and subcutaneous tissue was pronounced in the submandibular region. Cervical musculature with apparent edematous changes around the margins of the cervical musculature bilaterally. Source for the edematous changes is unclear. - Continue on medicine inpatient floor  - Continue Vancomycin 1500mg q12h  - FU blood culture, wound culture, MRSA nasal swab  - Pain control - tylenol and ibuprofen prn, Norco prn  - ID consulted, appreciate recs  - wound care consulted, appreciate recs  - warm compress  - Daily labs: CBC, BMP  - VSS as per protocol  - I&D (4/13) by gen.  Surgery, possible additional debridement for Friday 4/16  - ENT consulted, assessed patient 4/13 - non-tender mild edematous changes non-tender most likely 2/2 to significant posterior neck infection     HTN: BP on admission 146/94  - Continue home Amlodipine 10mg qD and Losartan 10mg qD     Hyperkalemia, resolved: K+ 3.3 on admission, today K 3.9  - replete as per protocol  - repeat BMP     HLD: Continue home Lipitor 10mg qHS and ASA 81mg qD  - ASA held for I&D     GERD: Omeprazole 20mg qD at home  - substitute with Protonix 20mg qD     Global Care:  - PT/OT   - case management   Diet NPO   DVT Prophylaxis Held Lovenox for I&D   Code status Full   Disposition  <2nights      Point of Contact Song Rhodes  Relationship:Friend  [phone]     Vani Gonzales  Relationship:Friend  [phone]       Toribio Kilgore MD, PGY1   [address]   Intern Pager: [phone]   April 14, 2021, 10:46 AM

## 2021-04-14 NOTE — PROGRESS NOTES
Otolaryngology head neck surgery  Patient seen and examined  Consult dictated  At the present time we see no evidence of any active infection in anterior neck  Patient with some mild edematous changes nontender which most likely are secondary to significant posterior neck infection  At this point would continue with present therapy  Patient dramatically improved at this point  We will follow-up with patient should there be any further issues    Zora Rockwell

## 2021-04-14 NOTE — PROGRESS NOTES
Infectious Disease progress Note        Reason: neck cellulitis, concern for dental infection    Current abx Prior abx   Vancomycin since 4/11/2021  clindamycin on 4/8/2021     Lines:       Assessment :    56 y.o. male with PMH HTN, HLD, GERD presented to  [facility] on 4/11/21 with complaint of neck pain and swelling. History of recurrent abscesses- right wrist, right leg in the past    Clinical presentation c/w posterior neck cellulitis/abscess, necrotizing infection due to MRSA    Wound cultures 4/11-methicillin-resistant staph aureus    MRSA screen positive. Status post surgical incision and drainage 4/13/2021. Surgery help appreciated    Persistent pain and tenderness despite above antibiotics likely due to undrained infection    Left mandibular first molar tooth caries and periodontal disease: No evidence of dental abscess noted on exam or CT scan 4/12/21    Right axillary abscess-spontaneously drained last week. No evidence of persistent induration on today's exam    Worsening leukocytosis noted today-likely transient increased inflammatory response secondary to recent surgery. No clinical evidence of worsening infection noted on today's exam.  Surgery follow-up appreciated. Agree with plans for clinical observation and decisions about further surgery based on the clinical course    Improved neck pain/swelling noted on today's exam.  Increased mobility of the neck    Recommendations:    1. Continue vancomycin  2. Monitor CBC, clinically  3. Wound care per surgery team  4. Further decisions based on the above test results, clinical course  5. Will prescribe MRSA decolonization treatment with Hibiclens, Bactroban ointment when patient ready for discharge       Above plan was discussed in details with patient. Please call me if any further questions or concerns. Will continue to participate in the care of this patient. HPI:    Feels better. Significant improved neck pain.   Improved ability to move his neck.  Denies subjective fever or chills. home Medication List    Details   clindamycin (CLEOCIN) 300 mg capsule Take 1 Cap by mouth four (4) times daily for 7 days. Qty: 28 Cap, Refills: 0      omega 3-dha-epa-fish oil (Fish Oil) 100-160-1,000 mg cap Take  by mouth. aspirin delayed-release 81 mg tablet Take  by mouth daily. omeprazole (PRILOSEC) 20 mg capsule Take  by mouth. atorvastatin (LIPITOR) 40 mg tablet Take  by mouth.      losartan (COZAAR) 100 mg tablet Take 100 mg by mouth daily. amLODIPine (NORVASC) 5 mg tablet Take 10 mg by mouth daily. HYDROcodone-acetaminophen (NORCO) 5-325 mg per tablet Take 1 Tab by mouth every six (6) hours as needed for Pain for up to 7 days. Max Daily Amount: 4 Tabs.   Qty: 14 Tab, Refills: 0    Associated Diagnoses: Cellulitis of neck             Current Facility-Administered Medications   Medication Dose Route Frequency    vancomycin (VANCOCIN) 1750 mg in  ml infusion  1,750 mg IntraVENous Q12H    ampicillin-sulbactam (UNASYN) 3 g in 0.9% sodium chloride (MBP/ADV) 100 mL MBP  3 g IntraVENous Q6H    sodium chloride (NS) flush 5-40 mL  5-40 mL IntraVENous Q8H    sodium chloride (NS) flush 5-40 mL  5-40 mL IntraVENous PRN    amLODIPine (NORVASC) tablet 10 mg  10 mg Oral DAILY    aspirin delayed-release tablet 81 mg  81 mg Oral DAILY    losartan (COZAAR) tablet 100 mg  100 mg Oral DAILY    pantoprazole (PROTONIX) tablet 20 mg  20 mg Oral DAILY    sodium chloride (NS) flush 5-40 mL  5-40 mL IntraVENous Q8H    sodium chloride (NS) flush 5-40 mL  5-40 mL IntraVENous PRN    acetaminophen (TYLENOL) tablet 650 mg  650 mg Oral Q6H PRN    Or    acetaminophen (TYLENOL) suppository 650 mg  650 mg Rectal Q6H PRN    polyethylene glycol (MIRALAX) packet 17 g  17 g Oral DAILY PRN    promethazine (PHENERGAN) tablet 12.5 mg  12.5 mg Oral Q6H PRN    Or    ondansetron (ZOFRAN) injection 4 mg  4 mg IntraVENous Q6H PRN    enoxaparin (LOVENOX) injection 40 mg  40 mg SubCUTAneous DAILY    atorvastatin (LIPITOR) tablet 10 mg  10 mg Oral QHS    ibuprofen (MOTRIN) tablet 600 mg  600 mg Oral Q6H PRN    HYDROcodone-acetaminophen (NORCO) 5-325 mg per tablet 1 Tab  1 Tab Oral Q4H PRN       Allergies: Patient has no known allergies. Temp (24hrs), Av.3 °F (36.8 °C), Min:97.3 °F (36.3 °C), Max:99.2 °F (37.3 °C)    Visit Vitals  BP (!) 142/90   Pulse 97   Temp 97.6 °F (36.4 °C)   Resp 16   Ht 5' 7\" (1.702 m)   Wt 86.2 kg (190 lb)   SpO2 98%   BMI 29.76 kg/m²       ROS: 12 point ROS obtained in details. Pertinent positives as mentioned in HPI,   otherwise negative    Physical Exam:    General: Well developed, well nourished male sitting on the  bed AAOx3 in no acute distress. General:   awake alert and oriented   HEENT:  Normocephalic, atraumatic,  EOMI, no scleral icterus or pallor; no conjunctival hemmohage;  nasal and oral mucous are moist and without evidence of lesions. No thrush. Filled dental cavities. No gingivitis. Surgical changes posterior neck; decreased erythema surrounding the surgical site. Improved ability to move the neck   Lymph Nodes:   no cervical, axillary or inguinal adenopathy   Lungs:   non-labored, bilateral chest movements equal   Heart:  RRR, s1 and s2; no rubs or gallops, no edema   Abdomen:  soft, non-distended, active bowel sounds, no hepatomegaly, no splenomegaly. Non-tender   Genitourinary:  deferred   Extremities:   no clubbing, cyanosis; no joint effusions or swelling; Full ROM of all large joints to the upper and lower extremities; muscle mass appropriate for age   Neurologic:  No gross focal sensory abnormalities; 5/5 muscle strength to upper and lower extremities. Speech appropriate.  Cranial nerves intact                        Skin:   Surgical changes posterior neck   Back:  no spinal or paraspinal muscle tenderness or rigidity, no CVA tenderness     Psychiatric:  No suicidal or homicidal ideations, appropriate mood and affect         Labs: Results:   Chemistry Recent Labs     04/14/21  0433 04/13/21  0215 04/12/21  1525   * 86 117*    141 139   K 3.7 3.7 3.9    108 105   CO2 28 23 29   BUN 19* 14 15   CREA 0.94 0.72 0.94   CA 8.5 8.2* 8.5   AGAP 5 10 5   BUCR 20 19 16      CBC w/Diff Recent Labs     04/14/21  0433 04/13/21  0215 04/12/21  0145   WBC 30.2* 27.0* 24.6*   RBC 4.33* 4.61 4.72   HGB 13.1 13.9 14.2   HCT 38.3 41.7 41.9    231 237   GRANS 97* 82* 85*   LYMPH 1* 4* 4*   EOS 0 0 1      Microbiology Recent Labs     04/13/21  1110 04/12/21  1142 04/11/21  2144 04/11/21  1642 04/11/21  1630   CULT PENDING MRSA PRESENT*  Screening of patient nares for MRSA is for surveillance purposes and, if positive, to facilitate isolation considerations in high risk settings. It is not intended for automatic decolonization interventions per se as regimens are not sufficiently effective to warrant routine use. HEAVY PROBABLE STAPHYLOCOCCUS AUREUS*  HEAVY PROBABLE STAPHYLOCOCCUS AUREUS* NO GROWTH 3 DAYS NO GROWTH 3 DAYS          RADIOLOGY:    All available imaging studies/reports in [facility] for this admission were reviewed           Disclaimer: Sections of this note are dictated utilizing voice recognition software, which may have resulted in some phonetic based errors in grammar and contents. Even though attempts were made to correct all the mistakes, some may have been missed, and remained in the body of the document. If questions arise, please contact our department.     Dr. Sima Garcia, Infectious Disease Specialist  [phone]  April 14, 2021  10:01 AM

## 2021-04-14 NOTE — CONSULTS
[address] [facility]    Name:  Blayne Frias  MR#:   [id number]  :  1960  ACCOUNT #:  [de-identified]  DATE OF SERVICE:  2021    REASON FOR EVALUATION:  Posterior neck abscess. HISTORY OF PRESENT ILLNESS:  The patient is a 68-year-old male who is being seen for difficulties with significant soft tissue swelling. The patient states his symptomatology began approximately five days prior. The patient was seen in the emergency room at [facility]. He was diagnosed with cellulitis of the neck. He was discharged home on clindamycin. The patient states he was noted to have some significant worsening of complaints. He was seen in the emergency room two days prior and subsequently admitted. The patient is noted to have difficulties with \"boils\" in the back of the neck, most likely secondary to folliculitis. He admits to picking his ears. He has a history of abscess and cellulitis in the past including in the underarms as well as in the groin. The patient also had some difficulties with cellulitis in the right hand and right leg in the past as well. The patient denies any history of diabetes. 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 The patient was noted to have a significant infection of the posterior neck. He was seen by General Surgery and underwent incision and drainage earlier today. Apparently, there was some concern that the patient may be having some extension into the anterior neck; hence, ENT consultation now requested. The patient states that he has dramatically improved since his incision and drainage. The patient did have a CT scan yesterday, which revealed some edematous changes of the facial skin and subcutaneous tissue without any evidence of any abscess formation. No evidence of any other significant findings were noted. Prior to incision and drainage, the patient was noted to have a white count of 27.   He was admitted, however, with a white count of 28. As noted, the patient states he has had dramatic improvement since incision and drainage. CURRENT MEDICATION USE:  Includes Tylenol, Norvasc, Unasyn, ASA, Lipitor, Lovenox, Norco, Motrin, Cozaar, and Vancocin. ALLERGIES:  DENIED. PAST SURGICAL HISTORY:  Noncontributory. PAST MEDICAL HISTORY:  Significant for hypertension. SOCIAL HISTORY:  The patient denies any tobacco or alcohol use. REVIEW OF SYSTEMS:  Noncontributory. PHYSICAL EXAMINATION:  GENERAL:  Reveals a well-developed, well-nourished, very pleasant 39-year-old male. Alert and oriented x3. Able to converse without difficulties. HEENT:  Intraoral exam reveals no evidence of any significant swelling. The posterior wall of the oropharynx, floor of the mouth, tongue, and the cheeks are within normal limits. The intranasal exam reveals no evidence of any mucopurulent discharge. No submandibular gland infection is noted. The facial skin is totally unremarkable without any evidence of any abnormality. The posterior neck reveals a dressing in place. This was taken down. Palpation around the dressing, however, reveals no evidence of any significant abnormality. NECK:  Reveals some mild edematous changes of the neck; however, this is not tender whatsoever. The trachea is midline. No thyromegaly is seen. CHEST:  Distant breath sounds. HEART:  S1 and S2. No murmur audible. IMPRESSION:  Abscess, posterior neck. This is most likely secondary to manipulation and folliculitis of the neck. The patient with previous difficulties with this in the past.    PLAN:  I really see no evidence of any anterior neck inflammation. There does appear to be some mild edema of the neck, which the patient states was not present until he has developed his posterior neck abscess. At the present time, I think the anterior neck is fairly safe. I would recommend continued antibiotics.   I would hold off on any further ENT evaluation. We will follow up the patient should he have any further difficulties. Thank you for your consultation.       Lorelei Bee MD      [id number]  D:  04/13/2021 21:25  T:  04/14/2021 5:33  JOB #:  [id number]

## 2021-04-14 NOTE — PROGRESS NOTES
conducted an initial consultation and Spiritual Assessment for Norma Escobedo, who is a 56 y. o.,male. Patient's Primary Language is: [place]. According to the patient's EMR [employer]ism Affiliation is: Aldo Ruiz. The reason the Patient came to the hospital is:   Patient Active Problem List    Diagnosis Date Noted    Sepsis due to cellulitis [facility]) 04/11/2021    Cellulitis 04/11/2021        The  provided the following Interventions:   was unable to assess at this time. Plan:  Chaplains will continue to follow and will provide pastoral care on an as needed/requested basis.  recommends bedside caregivers page  on duty if patient shows signs of acute spiritual or emotional distress.     [facility]   [phone]

## 2021-04-14 NOTE — WOUND CARE
Physical Exam 
Neck:  
 
 
Room 516: Focused wound assess Discussed care with primary nurse Radha Lester. Discussed with Dr. Clair Dietz via phone. Posterior neck, open surgical wound, 1x4x2.2cm, base 50%yellow, 50% pink, musty odor, mod to large amount serosanguinous drainage. Irrigation wound vac applied with veraflow using 3M cleanse choice dressing, bridged to right shoulder, 6ml Vashe solution, dwell 10 minutes, 125mmHG suction, cycle every 3.5 hours. Education provided to pt on plan of care & wound healing principles. Notified Grey ENRIQUEZ of need for home wound vac. Will add wound measurements to [employer]. Orders written for wound vac.   
Makenzie Miller BSN, RN, [facility], [address]

## 2021-04-14 NOTE — PROGRESS NOTES
Problem: Falls - Risk of  Goal: *Absence of Falls  Description: Document Virginia Christianson Fall Risk and appropriate interventions in the flowsheet.   Outcome: Progressing Towards Goal  Note: Fall Risk Interventions:            Medication Interventions: Patient to call before getting OOB                   Problem: Pain  Goal: *Control of Pain  Outcome: Progressing Towards Goal     Problem: Cellulitis Care Plan (Adult)  Goal: *Control of acute pain  Outcome: Progressing Towards Goal  Goal: *Skin integrity maintained  Outcome: Progressing Towards Goal  Goal: *Absence of infection signs and symptoms  Outcome: Progressing Towards Goal

## 2021-04-14 NOTE — PROGRESS NOTES
Patient seen and examined. He stated that he is feeling much better. He said that his neck pain is almost completely gone and he has no stiffness or discomfort. His vital signs are normal with no fever or tachycardia. However his leukocytosis has worsened. His dressing is in place    Plan:  I appreciate medicine management  Follow-up the cultures that were taken and follow-up the ID recommendation  Appreciate the ENT evaluation  I believe it safe to continue to follow the patient because clinically he is doing much better despite his worsening leukocytosis. I believe that this is taking some time to get better and I believe by tomorrow the leukocytosis should improve. However the patient may need further debridement if it does not improve by tomorrow I may take him on Friday for further debridement and drainage if needed.   But again currently the patient clinically is feeling great and I think it safe to continue with the current management  Wound care with the at least 2-3 times a day dressing changes with packing  Wound care consultation for home evaluation later when he is discharged  We will follow

## 2021-04-15 LAB
ANION GAP SERPL CALC-SCNC: 8 MMOL/L (ref 3–18)
BACTERIA SPEC CULT: ABNORMAL
BACTERIA SPEC CULT: NORMAL
BASOPHILS # BLD: 0 K/UL (ref 0–0.1)
BASOPHILS NFR BLD: 0 % (ref 0–2)
BUN SERPL-MCNC: 20 MG/DL (ref 7–18)
BUN/CREAT SERPL: 28 (ref 12–20)
CA-I SERPL-SCNC: 1.11 MMOL/L (ref 1.12–1.32)
CALCIUM SERPL-MCNC: 7.9 MG/DL (ref 8.5–10.1)
CHLORIDE SERPL-SCNC: 107 MMOL/L (ref 100–111)
CO2 SERPL-SCNC: 28 MMOL/L (ref 21–32)
CREAT SERPL-MCNC: 0.71 MG/DL (ref 0.6–1.3)
DIFFERENTIAL METHOD BLD: ABNORMAL
EOSINOPHIL # BLD: 0.3 K/UL (ref 0–0.4)
EOSINOPHIL NFR BLD: 1 % (ref 0–5)
ERYTHROCYTE [DISTWIDTH] IN BLOOD BY AUTOMATED COUNT: 14 % (ref 11.6–14.5)
GLUCOSE SERPL-MCNC: 101 MG/DL (ref 74–99)
GRAM STN SPEC: ABNORMAL
GRAM STN SPEC: ABNORMAL
HCT VFR BLD AUTO: 39.3 % (ref 36–48)
HGB BLD-MCNC: 13.1 G/DL (ref 13–16)
LYMPHOCYTES # BLD: 2.2 K/UL (ref 0.9–3.6)
LYMPHOCYTES NFR BLD: 8 % (ref 21–52)
MCH RBC QN AUTO: 29.6 PG (ref 24–34)
MCHC RBC AUTO-ENTMCNC: 33.3 G/DL (ref 31–37)
MCV RBC AUTO: 88.7 FL (ref 74–97)
METAMYELOCYTES NFR BLD MANUAL: 1 %
MONOCYTES # BLD: 1.1 K/UL (ref 0.05–1.2)
MONOCYTES NFR BLD: 4 % (ref 3–10)
NEUTS SEG # BLD: 23.1 K/UL (ref 1.8–8)
NEUTS SEG NFR BLD: 86 % (ref 40–73)
PLATELET # BLD AUTO: 325 K/UL (ref 135–420)
PLATELET COMMENTS,PCOM: ABNORMAL
PMV BLD AUTO: 9.2 FL (ref 9.2–11.8)
POTASSIUM SERPL-SCNC: 3.6 MMOL/L (ref 3.5–5.5)
RBC # BLD AUTO: 4.43 M/UL (ref 4.35–5.65)
RBC MORPH BLD: ABNORMAL
SERVICE CMNT-IMP: ABNORMAL
SERVICE CMNT-IMP: NORMAL
SODIUM SERPL-SCNC: 143 MMOL/L (ref 136–145)
WBC # BLD AUTO: 26.9 K/UL (ref 4.6–13.2)

## 2021-04-15 PROCEDURE — 74011000258 HC RX REV CODE- 258: Performed by: STUDENT IN AN ORGANIZED HEALTH CARE EDUCATION/TRAINING PROGRAM

## 2021-04-15 PROCEDURE — 82330 ASSAY OF CALCIUM: CPT

## 2021-04-15 PROCEDURE — 74011000258 HC RX REV CODE- 258: Performed by: SURGERY

## 2021-04-15 PROCEDURE — 74011250636 HC RX REV CODE- 250/636: Performed by: INTERNAL MEDICINE

## 2021-04-15 PROCEDURE — 65270000029 HC RM PRIVATE

## 2021-04-15 PROCEDURE — 2709999900 HC NON-CHARGEABLE SUPPLY

## 2021-04-15 PROCEDURE — 74011250636 HC RX REV CODE- 250/636: Performed by: STUDENT IN AN ORGANIZED HEALTH CARE EDUCATION/TRAINING PROGRAM

## 2021-04-15 PROCEDURE — 74011250637 HC RX REV CODE- 250/637: Performed by: FAMILY MEDICINE

## 2021-04-15 PROCEDURE — 85025 COMPLETE CBC W/AUTO DIFF WBC: CPT

## 2021-04-15 PROCEDURE — 74011250637 HC RX REV CODE- 250/637: Performed by: SURGERY

## 2021-04-15 PROCEDURE — 36415 COLL VENOUS BLD VENIPUNCTURE: CPT

## 2021-04-15 PROCEDURE — 74011250636 HC RX REV CODE- 250/636: Performed by: SURGERY

## 2021-04-15 PROCEDURE — 80048 BASIC METABOLIC PNL TOTAL CA: CPT

## 2021-04-15 PROCEDURE — 74011250636 HC RX REV CODE- 250/636: Performed by: FAMILY MEDICINE

## 2021-04-15 RX ORDER — LINEZOLID 600 MG/1
600 TABLET, FILM COATED ORAL 2 TIMES DAILY
Qty: 22 TAB | Refills: 0 | Status: SHIPPED | OUTPATIENT
Start: 2021-04-15 | End: 2021-04-26

## 2021-04-15 RX ORDER — POTASSIUM CHLORIDE 20 MEQ/1
20 TABLET, EXTENDED RELEASE ORAL
Status: COMPLETED | OUTPATIENT
Start: 2021-04-15 | End: 2021-04-15

## 2021-04-15 RX ADMIN — PANTOPRAZOLE SODIUM 20 MG: 20 TABLET, DELAYED RELEASE ORAL at 10:45

## 2021-04-15 RX ADMIN — Medication 10 ML: at 06:10

## 2021-04-15 RX ADMIN — Medication 10 ML: at 21:50

## 2021-04-15 RX ADMIN — Medication 81 MG: at 10:45

## 2021-04-15 RX ADMIN — LOSARTAN POTASSIUM 100 MG: 50 TABLET, FILM COATED ORAL at 10:45

## 2021-04-15 RX ADMIN — SODIUM CHLORIDE 3 G: 900 INJECTION INTRAVENOUS at 10:43

## 2021-04-15 RX ADMIN — CALCIUM GLUCONATE 1 G: 98 INJECTION, SOLUTION INTRAVENOUS at 17:00

## 2021-04-15 RX ADMIN — Medication 10 ML: at 14:53

## 2021-04-15 RX ADMIN — ENOXAPARIN SODIUM 40 MG: 40 INJECTION SUBCUTANEOUS at 10:44

## 2021-04-15 RX ADMIN — IBUPROFEN 600 MG: 600 TABLET, FILM COATED ORAL at 04:31

## 2021-04-15 RX ADMIN — AMLODIPINE BESYLATE 10 MG: 10 TABLET ORAL at 10:45

## 2021-04-15 RX ADMIN — SODIUM CHLORIDE 3 G: 900 INJECTION INTRAVENOUS at 22:18

## 2021-04-15 RX ADMIN — Medication 10 ML: at 14:52

## 2021-04-15 RX ADMIN — ATORVASTATIN CALCIUM 10 MG: 10 TABLET, FILM COATED ORAL at 22:19

## 2021-04-15 RX ADMIN — POTASSIUM CHLORIDE 20 MEQ: 1500 TABLET, EXTENDED RELEASE ORAL at 12:23

## 2021-04-15 RX ADMIN — SODIUM CHLORIDE 3 G: 900 INJECTION INTRAVENOUS at 04:21

## 2021-04-15 RX ADMIN — SODIUM CHLORIDE 3 G: 900 INJECTION INTRAVENOUS at 14:52

## 2021-04-15 RX ADMIN — POTASSIUM CHLORIDE 20 MEQ: 1500 TABLET, EXTENDED RELEASE ORAL at 10:46

## 2021-04-15 RX ADMIN — VANCOMYCIN HYDROCHLORIDE 1750 MG: 10 INJECTION, POWDER, LYOPHILIZED, FOR SOLUTION INTRAVENOUS at 18:42

## 2021-04-15 RX ADMIN — VANCOMYCIN HYDROCHLORIDE 1750 MG: 10 INJECTION, POWDER, LYOPHILIZED, FOR SOLUTION INTRAVENOUS at 04:21

## 2021-04-15 RX ADMIN — IBUPROFEN 600 MG: 600 TABLET, FILM COATED ORAL at 10:45

## 2021-04-15 NOTE — ROUTINE PROCESS
Bedside and Verbal shift change report given to PACO Damian (oncoming nurse) by Noe Garcia (offgoing nurse). Report included the following information SBAR, Kardex, Procedure Summary, Intake/Output, MAR and Recent Results.

## 2021-04-15 NOTE — ROUTINE PROCESS
Bedside shift change report given to Eldarion (oncoming nurse) by Maribel Maier (offgoing nurse). Report included the following information SBAR, Kardex, Intake/Output, MAR and Recent Results.

## 2021-04-15 NOTE — PROGRESS NOTES
Bedside and Verbal shift change report given to PACO Rivera (oncoming nurse) by Nguyen Salas RN (offgoing nurse). Report included the following information SBAR, Kardex, OR Summary, Procedure Summary, Intake/Output, MAR, Recent Results and Med Rec Status.

## 2021-04-15 NOTE — PROGRESS NOTES
PT eval order received and chart reviewed. Patient was previously evaluated and discharged on 4/12/21 for being at independent level. Spoke with patient who reports they are at functional baseline for mobility. Patient reports he is walking halls on his own and that they have no further need for assist or AD/DME. Will sign off per protocol and educated patient that if their functional mobility needs should change that they may have MD re-order PT at any time.  Thank you for this referral.     Karlee Bonilla, PT, DPT

## 2021-04-15 NOTE — PROGRESS NOTES
Patient stat labs for PTT put in to start heparin. Lab has not been to floor. Lab called. It is now 0042. Lab says they will be on the way for the STAT order now.

## 2021-04-15 NOTE — PROGRESS NOTES
Problem: Falls - Risk of  Goal: *Absence of Falls  Description: Document Virginia Christianson Fall Risk and appropriate interventions in the flowsheet. Outcome: Progressing Towards Goal  Note: Fall Risk Interventions:            Medication Interventions: Teach patient to arise slowly, Patient to call before getting OOB                   Problem: Cellulitis Care Plan (Adult)  Goal: *Skin integrity maintained  Outcome: Progressing Towards Goal  Goal: *Absence of infection signs and symptoms  Outcome: Progressing Towards Goal     Problem: Risk for Spread of Infection  Goal: Prevent transmission of infectious organism to others  Description: Prevent the transmission of infectious organisms to other patients, staff members, and visitors.   Outcome: Progressing Towards Goal     Problem: Pain  Goal: *Control of Pain  Outcome: Resolved/Met     Problem: Cellulitis Care Plan (Adult)  Goal: *Control of acute pain  Outcome: Resolved/Met     Problem: Patient Education: Go to Patient Education Activity  Goal: Patient/Family Education  Outcome: Resolved/Met     Problem: Patient Education: Go to Patient Education Activity  Goal: Patient/Family Education  Outcome: Resolved/Met     Problem: Patient Education:  Go to Education Activity  Goal: Patient/Family Education  Outcome: Resolved/Met

## 2021-04-15 NOTE — PROGRESS NOTES
ED HPI GENERAL MEDICAL PROBLEM





- General


Chief Complaint: ENT Problem


Stated Complaint: PAIN IN EARS/THRAOT


Time Seen by Provider: 08/29/17 19:16


Source of Information: Reports: Patient


History Limitations: Reports: No Limitations





- History of Present Illness


INITIAL COMMENTS - FREE TEXT/NARRATIVE: 





27-year-old female presents for evaluation and treatment of a sore throat, 

nausea, vomiting and earaches. Patient reports that the symptoms started last 

night. She is reporting pain in the bilateral ears but reports the left is 

greater than the right. No treatments prior to arrival in the ER. She is unsure 

if she's had any fevers as she has not taken her temperature. She also reports 

a pins and needles sensation in the left side of her jaw. Patient says she 

tried over-the-counter NyQuil last night without much symptom relief. 





Patient reports she frequently see a strep as a child. Last strep was in May. 

She reports that she still has her tonsils despite having frequent strep. She 

reports at one point she had a be treated with 3 different antibiotics for 

strep. She states over the last few years she would get strep every 2-3 months. 

She reports the last time she was on antibiotics for strep she developed severe 

yeast infection and what sounds like bacterial vaginosis.


  ** Throat


Pain Score (Numeric/FACES): 8





- Related Data


 Allergies











Allergy/AdvReac Type Severity Reaction Status Date / Time


 


codeine Allergy  Rash Verified 08/29/17 18:57


 


morphine Allergy  Hives Verified 08/29/17 18:57


 


ketorolac tromethamine AdvReac  Not good Verified 08/29/17 18:57





[From Toradol]   for her  





   kidneys  


 


lorazepam [From Ativan] AdvReac  Hallucinati Verified 08/29/17 18:57





   ons  


 


metoclopramide HCl AdvReac  Irritabilit Verified 08/29/17 18:57





[From Reglan]   y  











Home Meds: 


 Home Meds





. [No Known Home Meds]  08/29/17 [History]











Past Medical History





- Past Health History


Medical/Surgical History: Denies Medical/Surgical History


HEENT History: Reports: Impaired Vision


Other HEENT History: glasses and contacts


Gastrointestinal History: Reports: Hemorrhoids


Other Gastrointestinal History: Rectal fissures


Genitourinary History: Reports: Pyelonephritis, UTI, Recurrent


Other Genitourinary History: endometreosis


OB/GYN History: Reports: Endometriosis, Pregnancy


Neurological History: Reports: Migraines


Hematologic History: Reports: Anemia


Oncologic (Cancer) History: Reports: Uterine


Dermatologic History: Reports: Eczema





- Past Surgical History


GI Surgical History: Reports: Other (See Below)





Social & Family History





- Family History


Family Medical History: Noncontributory





- Tobacco Use


Smoking Status *Q: Never Smoker


Years of Tobacco use: 5


Used Tobacco, but Quit: Yes


Month Tobacco Last Used: quit in 2012


Second Hand Smoke Exposure: No





- Caffeine Use


Caffeine Use: Reports: None





- Alcohol Use


Days Per Week of Alcohol Use: 0





- Recreational Drug Use


Recreational Drug Use: No


Drug Use in Last 12 Months: No





- Living Situation & Occupation


Occupation: Unemployed





ED ROS ENT





- Review of Systems


Review Of Systems: See Below


Constitutional: Denies: Fever (unsure)


HEENT: Reports: Ear Pain (bilateral; left > right), Throat Pain


Respiratory: Denies: Cough


GI/Abdominal: Reports: Nausea, Vomiting


Neurological: Denies: Headache





ED EXAM, ENT





- Physical Exam


Exam: See Below


Exam Limited By: No Limitations


General Appearance: Alert, WD/WN, No Apparent Distress


Ears: Normal External Exam, Normal Canal, Hearing Grossly Normal, Normal TMs


Nose: Normal Inspection


Mouth/Throat: Normal Inspection, Normal Gums, Normal Lips, Pharyngeal Erythema, 

Tonsillar Erythema.  No: Throat Swelling, Uvular Deviation, Uvular Edema


Neck: Normal Inspection, Lymphadenopathy (L), Lymphadenopathy (R)


Respiratory/Chest: No Respiratory Distress, Lungs Clear, Normal Breath Sounds


Cardiovascular: Normal Peripheral Pulses, Regular Rate, Rhythm, No Murmur


Neurological: Alert, Oriented, Normal Cognition


Psychiatric: Normal Affect, Normal Mood


Skin: Warm, Dry, Normal Color





Course





- Vital Signs


Last Recorded V/S: 


 Last Vital Signs











Temp  36.3 C   08/29/17 18:54


 


Pulse  71   08/29/17 18:54


 


Resp  16   08/29/17 18:54


 


BP  124/82   08/29/17 18:54


 


Pulse Ox  98   08/29/17 18:54














- Orders/Labs/Meds


Orders: 


 Active Orders 24 hr











 Category Date Time Status


 


 CULTURE STREP A CONFIRMATION [] Stat Lab  08/29/17 19:30 Results


 


 STREP SCRN A RAPID W CULT CONF [RM] Stat Lab  08/29/17 19:30 Results














- Re-Assessments/Exams


Free Text/Narrative Re-Assessment/Exam: 





08/29/17 20:27


Rapid strep returned negative.


I reviewed this with the patient.


Likely a viral pharyngitis.





Discharge instructions as documented.








Departure





- Departure


Time of Disposition: 20:27


Disposition: Home, Self-Care 01


Condition: Good


Clinical Impression: 


 Viral pharyngitis








- Discharge Information


Instructions:  Pharyngitis, Easy-to-Read


Referrals: 


Donavon De León [Primary Care Provider] - 


Forms:  ED Department Discharge


Additional Instructions: 


Over-the-counter Tylenol or Motrin as needed for pain relief.





Recommend over-the-counter Chloraseptic spray, may try this as a gargle to help 

numb up the throat better.





If your symptoms are not much better in 7-10 days, follow-up with your primary 

care provider. Expect to the first 3 days of illness to be the worse.





Rest.





Please return to the ER if your symptoms change or worsen.





- My Orders


Last 24 Hours: 


My Active Orders





08/29/17 19:30


CULTURE STREP A CONFIRMATION [RM] Stat 


STREP SCRN A RAPID W CULT CONF [] Stat 














- Assessment/Plan


Last 24 Hours: 


My Active Orders





08/29/17 19:30


CULTURE STREP A CONFIRMATION [RM] Stat 


STREP SCRN A RAPID W CULT CONF [] Stat Problem: Falls - Risk of  Goal: *Absence of Falls  Description: Document Georgiairena Elvia Fall Risk and appropriate interventions in the flowsheet.   Outcome: Progressing Towards Goal  Note: Fall Risk Interventions:            Medication Interventions: Patient to call before getting OOB, Teach patient to arise slowly

## 2021-04-15 NOTE — PROGRESS NOTES
[facility]  Progress Note    Patient: Yehuda Obregon MRN: [id number]   SSN: [id number]  YOB: 1960   Age: 56 y.o. Sex: male      Admit Date: 4/11/2021    LOS: 4 days   Chief Complaint   Patient presents with    Skin Infection       Subjective:     No acute overnight events. Patient reports pain is minimal; patient has only required Motrin for pain control. Otherwise, patient feels well, is tolerating food well without N/V and is working with ambulating independently. Patient would like to go home with home health for wound vac management. Review of Systems   Constitutional: Negative. HENT: Negative. Eyes: Negative for blurred vision. Respiratory: Negative for cough, hemoptysis, shortness of breath and wheezing. Cardiovascular: Negative for chest pain, palpitations and leg swelling. Gastrointestinal: Negative for abdominal pain, nausea and vomiting. Musculoskeletal: Negative for neck pain. Neurological: Negative for dizziness and headaches. Objective:     Patient Vitals for the past 24 hrs:   Temp Pulse Resp BP SpO2   04/15/21 0842 98.6 °F (37 °C) 83 16 (!) 148/94 96 %   04/15/21 0429 97.1 °F (36.2 °C) 68 16 (!) 146/80 96 %   04/14/21 2015 96.9 °F (36.1 °C) 79 16 139/87 98 %   04/14/21 1657 97.8 °F (36.6 °C) 80 16 124/80 99 %   04/14/21 1214 97.5 °F (36.4 °C) 79 16 131/79 97 %     Intake and Output:  Current Shift: 04/15 0701 - 04/15 1900  In: 360 [P.O.:360]  Out: 900 [Urine:900]  Last three shifts: 04/13 1901 - 04/15 0700  In: 2766 [P.O.:2760]  Out: 1265 [Urine:1660]    Physical Exam  Constitutional:       General: He is not in acute distress. Appearance: He is normal weight. He is not ill-appearing, toxic-appearing or diaphoretic. Eyes:      General: No scleral icterus. Conjunctiva/sclera: Conjunctivae normal.   Neck:      Musculoskeletal: Normal range of motion. Muscular tenderness present.    Cardiovascular:      Rate and Rhythm: Normal rate and regular rhythm. Pulses: Normal pulses. Heart sounds: Normal heart sounds. No murmur. No friction rub. No gallop. Pulmonary:      Effort: Pulmonary effort is normal. No respiratory distress. Breath sounds: Normal breath sounds. No wheezing, rhonchi or rales. Abdominal:      General: Abdomen is flat. Bowel sounds are normal. There is no distension. Palpations: Abdomen is soft. Tenderness: There is no abdominal tenderness. There is no guarding or rebound. Musculoskeletal:         General: No swelling. Right lower leg: No edema. Left lower leg: No edema. Skin:     General: Skin is warm. Capillary Refill: Capillary refill takes less than 2 seconds. Findings: Lesion and rash present. Comments: Cellulitis at posterior neck debrided with bandage overlying.  - Right mandibular warm, erythematous and mildly swollen, swelling extended below mandible improved from previous day   Neurological:      Mental Status: He is oriented to person, place, and time. Motor: No weakness.        Lab/Data Review:  Recent Results (from the past 12 hour(s))   METABOLIC PANEL, BASIC    Collection Time: 04/15/21  3:15 AM   Result Value Ref Range    Sodium 143 136 - 145 mmol/L    Potassium 3.6 3.5 - 5.5 mmol/L    Chloride 107 100 - 111 mmol/L    CO2 28 21 - 32 mmol/L    Anion gap 8 3.0 - 18 mmol/L    Glucose 101 (H) 74 - 99 mg/dL    BUN 20 (H) 7.0 - 18 MG/DL    Creatinine 0.71 0.6 - 1.3 MG/DL    BUN/Creatinine ratio 28 (H) 12 - 20      GFR est AA >60 >60 ml/min/1.73m2    GFR est non-AA >60 >60 ml/min/1.73m2    Calcium 7.9 (L) 8.5 - 10.1 MG/DL   CBC WITH AUTOMATED DIFF    Collection Time: 04/15/21  3:15 AM   Result Value Ref Range    WBC 26.9 (H) 4.6 - 13.2 K/uL    RBC 4.43 4.35 - 5.65 M/uL    HGB 13.1 13.0 - 16.0 g/dL    HCT 39.3 36.0 - 48.0 %    MCV 88.7 74.0 - 97.0 FL    MCH 29.6 24.0 - 34.0 PG    MCHC 33.3 31.0 - 37.0 g/dL    RDW 14.0 11.6 - 14.5 %    PLATELET 256 425 - 423 K/uL MPV 9.2 9.2 - 11.8 FL    NEUTROPHILS 86 (H) 40 - 73 %    LYMPHOCYTES 8 (L) 21 - 52 %    MONOCYTES 4 3 - 10 %    EOSINOPHILS 1 0 - 5 %    BASOPHILS 0 0 - 2 %    METAMYELOCYTES 1 (H) 0 %    ABS. NEUTROPHILS 23.1 (H) 1.8 - 8.0 K/UL    ABS. LYMPHOCYTES 2.2 0.9 - 3.6 K/UL    ABS. MONOCYTES 1.1 0.05 - 1.2 K/UL    ABS. EOSINOPHILS 0.3 0.0 - 0.4 K/UL    ABS. BASOPHILS 0.0 0.0 - 0.1 K/UL    DF MANUAL      PLATELET COMMENTS ADEQUATE PLATELETS      RBC COMMENTS NORMOCYTIC, NORMOCHROMIC         RECENT RESULTS  MODALITY IMPRESSION   XR Results from Hospital Encounter encounter on 02/05/21   XR HAND RT MIN 3 V    Narrative RIGHT HAND RADIOGRAPH-3 VIEWS    INDICATION: Skin infection. COMPARISON: None    FINDINGS:  No evidence for acute fracture or dislocation. Mild degenerative change at the  first digit interphalangeal joint. Mild joint space narrowing at the first MCP  joint first Aia 16 joint. Prominent soft tissue swelling noted at the hyperthenar  aspect of the hand. Additional mild soft tissue swelling extends along the  dorsal aspect of the hand and ulnar aspect of the wrist. No evidence for  retained radiopaque foreign body. Impression 1. No acute osseous finding. 2. Soft tissue swelling as described. CT Results from [place] encounter on 04/11/21   CT MAXILLOFACIAL W CONT    Narrative EXAM:  CT Maxillofacial Study with Contrast                         INDICATION:  Facial and neck swelling. COMPARISON:  CT neck 04/08/21            TECHNIQUE:      - Helical volumetric scan of the maxillofacial structures is performed following  IV contrast (100 mL Isovue-300).  Coronal and sagittal reformation images are  generated for improved anatomic delineation.  - Radiation dose optimization techniques are utilized as appropriate to the  exam, with combination of automated exposure control, adjustment of the mA  and/or kV according to patient's size (Including appropriate matching for  site-specific examinations), or use of iterative reconstruction technique. FINDINGS:          Soft Tissues:    - Skin thickening in the left facial and neck region with subcutaneous fat  stranding, most pronounced in the submandibular region. No distinct drainable  fluid collection is demonstrated in the imaged area. - Bilateral cervical musculature with edematous changes (axial #63-76). - Multiple shotty lymph nodes. The largest of the lymph nodes on the left side  measures up to about 0.9 x 0.9 cm (axial #68). The largest of the right sided  lymph nodes measures about 1.0 x 0.8 cm (axial #62). Bones:    - No acute facial bone fracture. - Left mandibular 1st molar tooth with a developing dental cavity with  periapical subtle lucency. Miscellaneous:  Incidentally included base of the brain appears unremarkable. Impression           1. Edematous changes involving the facial skin and subcutaneous tissue was  pronounced in the submandibular region. The source for the edematous changes is  unclear. 2.  Cervical musculature with apparent edematous changes around the margins of  the cervical musculature bilaterally. Again the source for these edematous  changes is unclear on current image. Correlation with CT of the chest may be  helpful for further delineation. MRI No results found for this or any previous visit. ULTRASOUND Results from [place] encounter on 08/08/12   US EXT NONVAS LT LTD    Impression IMPRESSION:    Soft tissue swelling. No evidence for free fluid or soft tissue mass. Cardiology Procedures/Testing:  MODALITY RESULTS   EKG No results found for this or any previous visit.     ECHO       Special Testing/Procedures:  MODALITY RESULTS   MICRO All Micro Results     Procedure Component Value Units Date/Time    CULTURE, BLOOD [[id number]] Collected: 04/11/21 1630    Order Status: Completed Specimen: Blood Updated: 04/15/21 3818     Special Requests: NO SPECIAL REQUESTS        Culture result: NO GROWTH 4 DAYS       CULTURE, BLOOD [[id number]] Collected: 04/11/21 1642    Order Status: Completed Specimen: Blood Updated: 04/15/21 0734     Special Requests: NO SPECIAL REQUESTS        Culture result: NO GROWTH 4 DAYS       CULTURE, WOUND Buster Gretchen STAIN [[id number]]  (Abnormal)  (Susceptibility) Collected: 04/11/21 2144    Order Status: Completed Specimen: Wound from Neck Updated: 04/14/21 1109     Special Requests: NO SPECIAL REQUESTS        GRAM STAIN OCCASIONAL WBCS SEEN         3+ GRAM POSITIVE COCCI        Culture result:       HEAVY * METHICILLIN RESISTANT STAPHYLOCOCCUS AUREUS *          CULTURE, WOUND Buster Gretchen STAIN [[id number]]  (Abnormal)  (Susceptibility) Collected: 04/11/21 2144    Order Status: Completed Specimen: Boil Updated: 04/14/21 1107     Special Requests: R/O MRSA     GRAM STAIN OCCASIONAL WBCS SEEN         2+ GRAM POSITIVE COCCI        Culture result:       HEAVY * METHICILLIN RESISTANT STAPHYLOCOCCUS AUREUS *          CULTURE, ANAEROBIC [[id number]] Collected: 04/13/21 1110    Order Status: Completed Specimen: Wound Drainage Updated: 04/13/21 2310    CULTURE, MRSA [[id number]]  (Abnormal) Collected: 04/12/21 1142    Order Status: Completed Specimen: Nasal from Nares Updated: 04/13/21 1259     Special Requests: NO SPECIAL REQUESTS        Culture result: MRSA PRESENT               Screening of patient nares for MRSA is for surveillance purposes and, if positive, to facilitate isolation considerations in high risk settings. It is not intended for automatic decolonization interventions per se as regimens are not sufficiently effective to warrant routine use.           CULTURE, TISSUE W Willie Closs [[id number]] Collected: 04/13/21 1110    Order Status: Canceled Specimen: Neck     COVID-19 RAPID TEST [[id number]] Collected: 04/13/21 0915    Order Status: Completed Specimen: Nasopharyngeal Updated: 04/13/21 0945     Specimen source Nasopharyngeal COVID-19 rapid test Not detected        Comment: Rapid Abbott ID Now       Rapid NAAT:  The specimen is NEGATIVE for SARS-CoV-2, the novel coronavirus associated with COVID-19. Negative results should be treated as presumptive and, if inconsistent with clinical signs and symptoms or necessary for patient management, should be tested with an alternative molecular assay. Negative results do not preclude SARS-CoV-2 infection and should not be used as the sole basis for patient management decisions. This test has been authorized by the FDA under an Emergency Use Authorization (EUA) for use by authorized laboratories. Fact sheet for Healthcare Providers: [employer]  Fact sheet for Patients: [employer]       Methodology: Isothermal Nucleic Acid Amplification         CULTURE, MRSA [[id number]] Collected: 04/11/21 1800    Order Status: Canceled Specimen: Nasal          UA Results for orders placed or performed in visit on 09/18/20   AMB POC URINALYSIS DIP STICK AUTO W/O MICRO     Status: None   Result Value Ref Range Status    Color (UA POC) Yellow  Final    Clarity (UA POC) Clear  Final    Glucose (UA POC) Negative Negative Final    Bilirubin (UA POC) Negative Negative Final    Ketones (UA POC) Trace Negative Final    Specific gravity (UA POC) 1.020 1.001 - 1.035 Final    Blood (UA POC) Negative Negative Final    pH (UA POC) 6.5 4.6 - 8.0 Final    Protein (UA POC) 1+ Negative Final    Urobilinogen (UA POC) 0.2 mg/dL 0.2 - 1 Final    Nitrites (UA POC) Negative Negative Final    Leukocyte esterase (UA POC) Negative Negative Final        Telemetry NONE   Oxygen NONE     Assessment and Plan:     56 y.o. male with PMH HTN, HLD and GERD, now admitted with cellulitis of neck.     Sepsis 2/2 to cellulitis at neck: Cellulitis at back of neck with outpatient treatment failure.  Patient seen in the ED and discharged home with clindamycin with no resolution of symptoms. DDx include cellulitis vs. Abscess. Recurrent cellulitis most likely secondary to Hydradenitis suppurativa. Patient has been afebrile, hypertensive to 146/94 with mild tachycardia to 103. Labs significant for leukocytosis (WBC 28.3) with left shift, lactic acid wnl. Meets 2/4 SIRS criteria (tachycardia and leukocytosis). CT Neck (4/8) showed Soft tissue swelling throughout the cutaneous and subcutaneous posterior neck (cellulitis w/o abscess), Left mandibular first molar tooth dental and periodontal disease  · CT maxillofacial with contrast (4/12): Edematous changes involving the facial skin and subcutaneous tissue was pronounced in the submandibular region. Cervical musculature with apparent edematous changes around the margins of the cervical musculature bilaterally. Source for the edematous changes is unclear. · ENT consulted, assessed patient 4/13 - non-tender mild edematous changes non-tender most likely 2/2 to significant posterior neck infection  · MRSA nares: POSITIVE  · Wound culture (4/11): heavy staph aureus, 4/13 gram + cocci   · Blood culture (4/11): NG2D  - Continue on medicine inpatient floor  - Continue Vancomycin 1750mg q12h and and Unasyn 3g q6h  - Pain control - tylenol and ibuprofen prn, Norco prn  - ID consulted, appreciate recs  - wound care consulted, appreciate recs  - warm compress  - Daily labs: CBC, BMP  - VSS as per protocol  - I&D (4/14) by gen.  Surgery, possible additional debridement for Friday 4/16      HTN: BP on admission 146/94  - Continue home Amlodipine 10mg qD and Losartan 10mg qD     Hyperkalemia, resolved: K+ 3.3 on admission  - replete as per protocol  - repeat BMP     HLD: Continue home Lipitor 10mg qHS and ASA 81mg qD  - ASA held for I&D     GERD: Omeprazole 20mg qD at home  - substitute with Protonix 20mg qD     Global Care:  - PT/OT   - case management   Diet NPO   DVT Prophylaxis Held Lovenox for I&D   Code status Full   Disposition  <2nights      Point of Contact Song Rhodes  Relationship:Friend  [phone]     Mimi Hedinh  Relationship:Friend  [phone]       Ltaha Rojas MD, PGY1   [address]   Intern Pager: [phone]   April 15, 2021, 10:32 AM

## 2021-04-15 NOTE — PROGRESS NOTES
New OT orders received with patient already evaluated and discharged on 4/12/2021 from caseload. Patient reports they are at functional baseline for mobility/self-care tasks and has been walking halls/to the restroom for toileting tasks. No indication for re-evaluation at this time. Acknowledged new orders.          Thank you,   Ronald Roberson MS, OTR/L

## 2021-04-15 NOTE — WOUND CARE
Physical Exam  
Room 516: pt doing well, less redness to posterior neck periwound, irrigation vac continues. Discussed with primary nurse Janelle ANTONIO. Jason CHRISTINEN, RN, [facility], [address]

## 2021-04-15 NOTE — PROGRESS NOTES
Infectious Disease progress Note        Reason: neck cellulitis, concern for dental infection    Current abx Prior abx   Vancomycin since 4/11/2021  clindamycin on 4/8/2021     Lines:       Assessment :    56 y.o. male with PMH HTN, HLD, GERD presented to  [facility] on 4/11/21 with complaint of neck pain and swelling. History of recurrent abscesses- right wrist, right leg in the past    Clinical presentation c/w posterior neck cellulitis/abscess, necrotizing infection due to MRSA    Wound cultures 4/11-methicillin-resistant staph aureus    MRSA screen positive. Status post surgical incision and drainage 4/13/2021. Surgery help appreciated    Persistent pain and tenderness despite above antibiotics likely due to undrained infection    Left mandibular first molar tooth caries and periodontal disease: No evidence of dental abscess noted on exam or CT scan 4/12/21    Right axillary abscess-spontaneously drained last week. No evidence of persistent induration on today's exam    Worsening leukocytosis noted today-likely transient increased inflammatory response secondary to recent surgery. No clinical evidence of worsening infection noted on today's exam.  Surgery follow-up appreciated. Agree with plans for clinical observation and decisions about further surgery based on the clinical course    Improved neck pain/swelling noted on today's exam.  Increased mobility of the neck    Recommendations:    1. Continue vancomycin  2. Monitor CBC, clinically  3. Wound care per surgery team  4. Further decisions based on the above test results, clinical course  5. Recommend MRSA decolonization treatment (Hibiclens 4 percent bodywash daily x 7 days to be used simultaneously with mupirocin nasal ointment to both nares, under fingernails, axilla, groin bid x 7 days followed by weekly hibiclens bodywash ) after neck wound heals.    6.  Will be able to switch to oral linezolid in 1 to 2 days if continued clinical improvement noted in a.m. Linezolid prescription given to      Above plan was discussed in details with patient, Dr. Pat Perez. Please call me if any further questions or concerns. Will continue to participate in the care of this patient. HPI:    Feels better. Significant improved neck pain. Improved ability to move his neck. Denies subjective fever or chills. home Medication List    Details   clindamycin (CLEOCIN) 300 mg capsule Take 1 Cap by mouth four (4) times daily for 7 days. Qty: 28 Cap, Refills: 0      omega 3-dha-epa-fish oil (Fish Oil) 100-160-1,000 mg cap Take  by mouth. aspirin delayed-release 81 mg tablet Take  by mouth daily. omeprazole (PRILOSEC) 20 mg capsule Take  by mouth. atorvastatin (LIPITOR) 40 mg tablet Take  by mouth.      losartan (COZAAR) 100 mg tablet Take 100 mg by mouth daily. amLODIPine (NORVASC) 5 mg tablet Take 10 mg by mouth daily. HYDROcodone-acetaminophen (NORCO) 5-325 mg per tablet Take 1 Tab by mouth every six (6) hours as needed for Pain for up to 7 days. Max Daily Amount: 4 Tabs.   Qty: 14 Tab, Refills: 0    Associated Diagnoses: Cellulitis of neck             Current Facility-Administered Medications   Medication Dose Route Frequency    vancomycin (VANCOCIN) 1750 mg in  ml infusion  1,750 mg IntraVENous Q12H    ampicillin-sulbactam (UNASYN) 3 g in 0.9% sodium chloride (MBP/ADV) 100 mL MBP  3 g IntraVENous Q6H    sodium chloride (NS) flush 5-40 mL  5-40 mL IntraVENous Q8H    sodium chloride (NS) flush 5-40 mL  5-40 mL IntraVENous PRN    amLODIPine (NORVASC) tablet 10 mg  10 mg Oral DAILY    aspirin delayed-release tablet 81 mg  81 mg Oral DAILY    losartan (COZAAR) tablet 100 mg  100 mg Oral DAILY    pantoprazole (PROTONIX) tablet 20 mg  20 mg Oral DAILY    sodium chloride (NS) flush 5-40 mL  5-40 mL IntraVENous Q8H    sodium chloride (NS) flush 5-40 mL  5-40 mL IntraVENous PRN    acetaminophen (TYLENOL) tablet 650 mg  650 mg Oral Q6H PRN    Or    acetaminophen (TYLENOL) suppository 650 mg  650 mg Rectal Q6H PRN    polyethylene glycol (MIRALAX) packet 17 g  17 g Oral DAILY PRN    promethazine (PHENERGAN) tablet 12.5 mg  12.5 mg Oral Q6H PRN    Or    ondansetron (ZOFRAN) injection 4 mg  4 mg IntraVENous Q6H PRN    enoxaparin (LOVENOX) injection 40 mg  40 mg SubCUTAneous DAILY    atorvastatin (LIPITOR) tablet 10 mg  10 mg Oral QHS    ibuprofen (MOTRIN) tablet 600 mg  600 mg Oral Q6H PRN    HYDROcodone-acetaminophen (NORCO) 5-325 mg per tablet 1 Tab  1 Tab Oral Q4H PRN       Allergies: Patient has no known allergies. Temp (24hrs), Av.6 °F (36.4 °C), Min:96.9 °F (36.1 °C), Max:98.6 °F (37 °C)    Visit Vitals  BP (!) 148/94 (BP 1 Location: Left upper arm, BP Patient Position: Sitting)   Pulse 83   Temp 98.6 °F (37 °C)   Resp 16   Ht 5' 7\" (1.702 m)   Wt 86.2 kg (190 lb)   SpO2 96%   BMI 29.76 kg/m²       ROS: 12 point ROS obtained in details. Pertinent positives as mentioned in HPI,   otherwise negative    Physical Exam:    General: Well developed, well nourished male sitting on the  bed AAOx3 in no acute distress. General:   awake alert and oriented   HEENT:  Normocephalic, atraumatic,  EOMI, no scleral icterus or pallor; no conjunctival hemmohage;  nasal and oral mucous are moist and without evidence of lesions. No thrush. Filled dental cavities. No gingivitis. Surgical changes posterior neck; decreased erythema surrounding the surgical site. Improved ability to move the neck   Lymph Nodes:   no cervical, axillary or inguinal adenopathy   Lungs:   non-labored, bilateral chest movements equal   Heart:  RRR, s1 and s2; no rubs or gallops, no edema   Abdomen:  soft, non-distended, active bowel sounds, no hepatomegaly, no splenomegaly. Non-tender   Genitourinary:  deferred   Extremities:   no clubbing, cyanosis; no joint effusions or swelling;  Full ROM of all large joints to the upper and lower extremities; muscle mass appropriate for age   Neurologic:  No gross focal sensory abnormalities; 5/5 muscle strength to upper and lower extremities. Speech appropriate. Cranial nerves intact                        Skin:   Surgical changes posterior neck   Back:  no spinal or paraspinal muscle tenderness or rigidity, no CVA tenderness     Psychiatric:  No suicidal or homicidal ideations, appropriate mood and affect         Labs: Results:   Chemistry Recent Labs     04/15/21  0315 04/14/21  0433 04/13/21  0215   * 143* 86    140 141   K 3.6 3.7 3.7    107 108   CO2 28 28 23   BUN 20* 19* 14   CREA 0.71 0.94 0.72   CA 7.9* 8.5 8.2*   AGAP 8 5 10   BUCR 28* 20 19      CBC w/Diff Recent Labs     04/15/21  0315 04/14/21  0433 04/13/21  0215   WBC 26.9* 30.2* 27.0*   RBC 4.43 4.33* 4.61   HGB 13.1 13.1 13.9   HCT 39.3 38.3 41.7    306 231   GRANS 86* 97* 82*   LYMPH 8* 1* 4*   EOS 1 0 0      Microbiology Recent Labs     04/13/21  1110 04/12/21  1142   CULT MODERATE PROBABLE STAPHYLOCOCCUS AUREUS* MRSA PRESENT*  Screening of patient nares for MRSA is for surveillance purposes and, if positive, to facilitate isolation considerations in high risk settings. It is not intended for automatic decolonization interventions per se as regimens are not sufficiently effective to warrant routine use. RADIOLOGY:    All available imaging studies/reports in [facility] for this admission were reviewed           Disclaimer: Sections of this note are dictated utilizing voice recognition software, which may have resulted in some phonetic based errors in grammar and contents. Even though attempts were made to correct all the mistakes, some may have been missed, and remained in the body of the document. If questions arise, please contact our department.     Dr. Geovanni Laurent, Infectious Disease Specialist  [phone]  April 15, 2021  10:01 AM

## 2021-04-16 ENCOUNTER — HOME HEALTH ADMISSION (OUTPATIENT)
Dept: HOME HEALTH SERVICES | Facility: HOME HEALTH | Age: 61
End: 2021-04-16
Payer: COMMERCIAL

## 2021-04-16 VITALS
SYSTOLIC BLOOD PRESSURE: 144 MMHG | OXYGEN SATURATION: 97 % | BODY MASS INDEX: 29.82 KG/M2 | TEMPERATURE: 97.4 F | HEART RATE: 66 BPM | HEIGHT: 67 IN | RESPIRATION RATE: 17 BRPM | WEIGHT: 190 LBS | DIASTOLIC BLOOD PRESSURE: 84 MMHG

## 2021-04-16 LAB
ANION GAP SERPL CALC-SCNC: 5 MMOL/L (ref 3–18)
BASOPHILS # BLD: 0 K/UL (ref 0–0.1)
BASOPHILS NFR BLD: 0 % (ref 0–2)
BUN SERPL-MCNC: 16 MG/DL (ref 7–18)
BUN/CREAT SERPL: 23 (ref 12–20)
CALCIUM SERPL-MCNC: 8.3 MG/DL (ref 8.5–10.1)
CHLORIDE SERPL-SCNC: 106 MMOL/L (ref 100–111)
CO2 SERPL-SCNC: 27 MMOL/L (ref 21–32)
CREAT SERPL-MCNC: 0.71 MG/DL (ref 0.6–1.3)
DATE LAST DOSE: NORMAL
DIFFERENTIAL METHOD BLD: ABNORMAL
EOSINOPHIL # BLD: 0.4 K/UL (ref 0–0.4)
EOSINOPHIL NFR BLD: 2 % (ref 0–5)
ERYTHROCYTE [DISTWIDTH] IN BLOOD BY AUTOMATED COUNT: 13.8 % (ref 11.6–14.5)
GLUCOSE SERPL-MCNC: 96 MG/DL (ref 74–99)
HCT VFR BLD AUTO: 40.5 % (ref 36–48)
HGB BLD-MCNC: 14.1 G/DL (ref 13–16)
LYMPHOCYTES # BLD: 3.2 K/UL (ref 0.9–3.6)
LYMPHOCYTES NFR BLD: 18 % (ref 21–52)
MCH RBC QN AUTO: 30.9 PG (ref 24–34)
MCHC RBC AUTO-ENTMCNC: 34.8 G/DL (ref 31–37)
MCV RBC AUTO: 88.6 FL (ref 74–97)
METAMYELOCYTES NFR BLD MANUAL: 2 %
MONOCYTES # BLD: 1.4 K/UL (ref 0.05–1.2)
MONOCYTES NFR BLD: 8 % (ref 3–10)
MYELOCYTES NFR BLD MANUAL: 2 %
NEUTS BAND NFR BLD MANUAL: 8 % (ref 0–5)
NEUTS SEG # BLD: 11.9 K/UL (ref 1.8–8)
NEUTS SEG NFR BLD: 60 % (ref 40–73)
PLATELET # BLD AUTO: 353 K/UL (ref 135–420)
PLATELET COMMENTS,PCOM: ABNORMAL
PMV BLD AUTO: 9.1 FL (ref 9.2–11.8)
POTASSIUM SERPL-SCNC: 3.4 MMOL/L (ref 3.5–5.5)
RBC # BLD AUTO: 4.57 M/UL (ref 4.35–5.65)
RBC MORPH BLD: ABNORMAL
REPORTED DOSE,DOSE: NORMAL UNITS
REPORTED DOSE/TIME,TMG: 1700
SODIUM SERPL-SCNC: 138 MMOL/L (ref 136–145)
VANCOMYCIN TROUGH SERPL-MCNC: 17.8 UG/ML (ref 10–20)
WBC # BLD AUTO: 17.5 K/UL (ref 4.6–13.2)

## 2021-04-16 PROCEDURE — 74011250637 HC RX REV CODE- 250/637: Performed by: FAMILY MEDICINE

## 2021-04-16 PROCEDURE — 2709999900 HC NON-CHARGEABLE SUPPLY

## 2021-04-16 PROCEDURE — 85025 COMPLETE CBC W/AUTO DIFF WBC: CPT

## 2021-04-16 PROCEDURE — 74011250637 HC RX REV CODE- 250/637: Performed by: SURGERY

## 2021-04-16 PROCEDURE — 74011000258 HC RX REV CODE- 258: Performed by: SURGERY

## 2021-04-16 PROCEDURE — 74011250636 HC RX REV CODE- 250/636: Performed by: SURGERY

## 2021-04-16 PROCEDURE — 80202 ASSAY OF VANCOMYCIN: CPT

## 2021-04-16 PROCEDURE — 36415 COLL VENOUS BLD VENIPUNCTURE: CPT

## 2021-04-16 PROCEDURE — 74011250637 HC RX REV CODE- 250/637: Performed by: STUDENT IN AN ORGANIZED HEALTH CARE EDUCATION/TRAINING PROGRAM

## 2021-04-16 PROCEDURE — 77030019934 HC DRSG VAC ASST KCON -B

## 2021-04-16 PROCEDURE — 74011250636 HC RX REV CODE- 250/636: Performed by: INTERNAL MEDICINE

## 2021-04-16 PROCEDURE — 80048 BASIC METABOLIC PNL TOTAL CA: CPT

## 2021-04-16 PROCEDURE — 97605 NEG PRS WND THER DME<=50SQCM: CPT

## 2021-04-16 RX ORDER — IBUPROFEN 600 MG/1
600 TABLET ORAL
Qty: 20 TAB | Refills: 0 | Status: SHIPPED | OUTPATIENT
Start: 2021-04-16 | End: 2021-04-23

## 2021-04-16 RX ORDER — POTASSIUM CHLORIDE 20 MEQ/1
20 TABLET, EXTENDED RELEASE ORAL
Status: COMPLETED | OUTPATIENT
Start: 2021-04-16 | End: 2021-04-16

## 2021-04-16 RX ORDER — CHLORHEXIDINE GLUCONATE 4 G/100ML
1 SOLUTION TOPICAL 2 TIMES DAILY
Qty: 1652 ML | Refills: 0 | Status: SHIPPED | OUTPATIENT
Start: 2021-04-16 | End: 2021-04-16 | Stop reason: SDUPTHER

## 2021-04-16 RX ORDER — CHLORHEXIDINE GLUCONATE 4 G/100ML
2 SOLUTION TOPICAL
Qty: 944 ML | Refills: 0 | Status: SHIPPED | OUTPATIENT
Start: 2021-04-23 | End: 2021-05-23

## 2021-04-16 RX ORDER — ACETAMINOPHEN 325 MG/1
650 TABLET ORAL
Qty: 40 TAB | Refills: 0 | Status: SHIPPED | OUTPATIENT
Start: 2021-04-16 | End: 2021-04-23

## 2021-04-16 RX ORDER — HYDROCODONE BITARTRATE AND ACETAMINOPHEN 5; 325 MG/1; MG/1
1 TABLET ORAL
Qty: 18 TAB | Refills: 0 | Status: SHIPPED | OUTPATIENT
Start: 2021-04-16 | End: 2021-04-19

## 2021-04-16 RX ORDER — CHLORHEXIDINE GLUCONATE 4 G/100ML
2 SOLUTION TOPICAL DAILY
Qty: 1652 ML | Refills: 0 | Status: SHIPPED | OUTPATIENT
Start: 2021-04-16 | End: 2021-04-23

## 2021-04-16 RX ADMIN — SODIUM CHLORIDE 3 G: 900 INJECTION INTRAVENOUS at 08:33

## 2021-04-16 RX ADMIN — SODIUM CHLORIDE 3 G: 900 INJECTION INTRAVENOUS at 02:49

## 2021-04-16 RX ADMIN — Medication 10 ML: at 06:39

## 2021-04-16 RX ADMIN — Medication 10 ML: at 02:55

## 2021-04-16 RX ADMIN — POTASSIUM CHLORIDE 20 MEQ: 1500 TABLET, EXTENDED RELEASE ORAL at 09:20

## 2021-04-16 RX ADMIN — POTASSIUM CHLORIDE 20 MEQ: 1500 TABLET, EXTENDED RELEASE ORAL at 08:33

## 2021-04-16 RX ADMIN — AMLODIPINE BESYLATE 10 MG: 10 TABLET ORAL at 08:32

## 2021-04-16 RX ADMIN — HYDROCODONE BITARTRATE AND ACETAMINOPHEN 1 TABLET: 5; 325 TABLET ORAL at 02:50

## 2021-04-16 RX ADMIN — POTASSIUM CHLORIDE 20 MEQ: 1500 TABLET, EXTENDED RELEASE ORAL at 13:08

## 2021-04-16 RX ADMIN — PANTOPRAZOLE SODIUM 20 MG: 20 TABLET, DELAYED RELEASE ORAL at 08:32

## 2021-04-16 RX ADMIN — VANCOMYCIN HYDROCHLORIDE 1750 MG: 10 INJECTION, POWDER, LYOPHILIZED, FOR SOLUTION INTRAVENOUS at 05:38

## 2021-04-16 RX ADMIN — LOSARTAN POTASSIUM 100 MG: 50 TABLET, FILM COATED ORAL at 08:32

## 2021-04-16 NOTE — ROUTINE PROCESS
I have reviewed discharge instructions and medications with the patient. The patient verbalized understanding. Patient armband removed and shredded Dressings C/D/I. No questions or concerns verbalized.

## 2021-04-16 NOTE — PROGRESS NOTES
Infectious Disease progress Note        Reason: neck cellulitis, concern for dental infection    Current abx Prior abx   Vancomycin since 4/11/2021  clindamycin on 4/8/2021     Lines:       Assessment :    56 y.o. male with PMH HTN, HLD, GERD presented to [facility] on 4/11/21 with complaint of neck pain and swelling. History of recurrent abscesses- right wrist, right leg in the past    Clinical presentation c/w posterior neck cellulitis/abscess, necrotizing infection due to MRSA    Wound cultures 4/11-methicillin-resistant staph aureus    MRSA screen positive. Status post surgical incision and drainage 4/13/2021. Surgery help appreciated    Persistent pain and tenderness despite above antibiotics likely due to undrained infection    Left mandibular first molar tooth caries and periodontal disease: No evidence of dental abscess noted on exam or CT scan 4/12/21    Right axillary abscess-spontaneously drained last week. No evidence of persistent induration on today's exam    Worsening leukocytosis noted today-likely transient increased inflammatory response secondary to recent surgery. No clinical evidence of worsening infection noted on today's exam.  Surgery follow-up appreciated. Agree with plans for clinical observation and decisions about further surgery based on the clinical course    Improved neck pain/swelling noted on today's exam.  Increased mobility of the neck    Wound examined today-slough noted at wound bed. Discussed with Dr. Clair Dietz. Does not feel need for any further surgical debridement at this time. Recommendations:    1. D/c vancomycin  2. Wound care per surgery team  3. Switch to oral linezolid for 12 more days-discussed with . Approved by insurance  4.   Recommend MRSA decolonization treatment (Hibiclens 4 percent bodywash daily x 7 days to be used simultaneously with mupirocin nasal ointment to both nares, under fingernails, axilla, groin bid x 7 days followed by weekly hibiclens bodywash ) after neck wound heals. 5.  Follow-up with surgery as outpatient     Above plan was discussed in details with patient, Dr. Galindo Steele, . Please call me if any further questions or concerns. Will continue to participate in the care of this patient. HPI:    Feels better. Significant improved neck pain. Improved ability to move his neck. Denies subjective fever or chills. home Medication List    Details   clindamycin (CLEOCIN) 300 mg capsule Take 1 Cap by mouth four (4) times daily for 7 days. Qty: 28 Cap, Refills: 0      omega 3-dha-epa-fish oil (Fish Oil) 100-160-1,000 mg cap Take  by mouth. aspirin delayed-release 81 mg tablet Take  by mouth daily. omeprazole (PRILOSEC) 20 mg capsule Take  by mouth. atorvastatin (LIPITOR) 40 mg tablet Take  by mouth.      losartan (COZAAR) 100 mg tablet Take 100 mg by mouth daily. amLODIPine (NORVASC) 5 mg tablet Take 10 mg by mouth daily. HYDROcodone-acetaminophen (NORCO) 5-325 mg per tablet Take 1 Tab by mouth every six (6) hours as needed for Pain for up to 7 days. Max Daily Amount: 4 Tabs.   Qty: 14 Tab, Refills: 0    Associated Diagnoses: Cellulitis of neck             Current Facility-Administered Medications   Medication Dose Route Frequency    potassium chloride (K-DUR, KLOR-CON) SR tablet 20 mEq  20 mEq Oral Q2H    vancomycin (VANCOCIN) 1750 mg in  ml infusion  1,750 mg IntraVENous Q12H    sodium chloride (NS) flush 5-40 mL  5-40 mL IntraVENous Q8H    sodium chloride (NS) flush 5-40 mL  5-40 mL IntraVENous PRN    amLODIPine (NORVASC) tablet 10 mg  10 mg Oral DAILY    aspirin delayed-release tablet 81 mg  81 mg Oral DAILY    losartan (COZAAR) tablet 100 mg  100 mg Oral DAILY    pantoprazole (PROTONIX) tablet 20 mg  20 mg Oral DAILY    sodium chloride (NS) flush 5-40 mL  5-40 mL IntraVENous Q8H    sodium chloride (NS) flush 5-40 mL  5-40 mL IntraVENous PRN    acetaminophen (TYLENOL) tablet 650 mg 650 mg Oral Q6H PRN    Or    acetaminophen (TYLENOL) suppository 650 mg  650 mg Rectal Q6H PRN    polyethylene glycol (MIRALAX) packet 17 g  17 g Oral DAILY PRN    promethazine (PHENERGAN) tablet 12.5 mg  12.5 mg Oral Q6H PRN    Or    ondansetron (ZOFRAN) injection 4 mg  4 mg IntraVENous Q6H PRN    [Held by provider] enoxaparin (LOVENOX) injection 40 mg  40 mg SubCUTAneous DAILY    atorvastatin (LIPITOR) tablet 10 mg  10 mg Oral QHS    ibuprofen (MOTRIN) tablet 600 mg  600 mg Oral Q6H PRN    HYDROcodone-acetaminophen (NORCO) 5-325 mg per tablet 1 Tab  1 Tab Oral Q4H PRN       Allergies: Patient has no known allergies. Temp (24hrs), Av.1 °F (36.2 °C), Min:96.5 °F (35.8 °C), Max:98 °F (36.7 °C)    Visit Vitals  BP (!) 143/91   Pulse 69   Temp 97 °F (36.1 °C)   Resp 18   Ht 5' 7\" (1.702 m)   Wt 86.2 kg (190 lb)   SpO2 96%   BMI 29.76 kg/m²       ROS: 12 point ROS obtained in details. Pertinent positives as mentioned in HPI,   otherwise negative    Physical Exam:    General: Well developed, well nourished male sitting on the  bed AAOx3 in no acute distress. General:   awake alert and oriented   HEENT:  Normocephalic, atraumatic,  EOMI, no scleral icterus or pallor; no conjunctival hemmohage;  nasal and oral mucous are moist and without evidence of lesions. No thrush. Filled dental cavities. No gingivitis. Surgical changes posterior neck; decreased erythema surrounding the surgical site. Improved ability to move the neck   Lymph Nodes:   no cervical, axillary or inguinal adenopathy   Lungs:   non-labored, bilateral chest movements equal   Heart:  RRR, s1 and s2; no rubs or gallops, no edema   Abdomen:  soft, non-distended, active bowel sounds, no hepatomegaly, no splenomegaly. Non-tender   Genitourinary:  deferred   Extremities:   no clubbing, cyanosis; no joint effusions or swelling;  Full ROM of all large joints to the upper and lower extremities; muscle mass appropriate for age   Neurologic:  No gross focal sensory abnormalities; 5/5 muscle strength to upper and lower extremities. Speech appropriate. Cranial nerves intact                        Skin:   Surgical changes posterior neck   Back:  no spinal or paraspinal muscle tenderness or rigidity, no CVA tenderness     Psychiatric:  No suicidal or homicidal ideations, appropriate mood and affect         Labs: Results:   Chemistry Recent Labs     04/16/21  0440 04/15/21  0315 04/14/21  0433   GLU 96 101* 143*    143 140   K 3.4* 3.6 3.7    107 107   CO2 27 28 28   BUN 16 20* 19*   CREA 0.71 0.71 0.94   CA 8.3* 7.9* 8.5   AGAP 5 8 5   BUCR 23* 28* 20      CBC w/Diff Recent Labs     04/16/21  0440 04/15/21  0315 04/14/21  0433   WBC 17.5* 26.9* 30.2*   RBC 4.57 4.43 4.33*   HGB 14.1 13.1 13.1   HCT 40.5 39.3 38.3    325 306   GRANS 60 86* 97*   LYMPH 18* 8* 1*   EOS 2 1 0      Microbiology Recent Labs     04/13/21  1110   CULT MODERATE * METHICILLIN RESISTANT STAPHYLOCOCCUS AUREUS **  NO ANAEROBES ISOLATED          RADIOLOGY:    All available imaging studies/reports in [facility] care for this admission were reviewed           Disclaimer: Sections of this note are dictated utilizing voice recognition software, which may have resulted in some phonetic based errors in grammar and contents. Even though attempts were made to correct all the mistakes, some may have been missed, and remained in the body of the document. If questions arise, please contact our department.     Dr. Christiano Powell, Infectious Disease Specialist  [phone]  April 16, 2021  10:01 AM

## 2021-04-16 NOTE — DISCHARGE INSTRUCTIONS
MRSA Decolonization Regimen  Hibiclens 4 percent bodywash daily x 7 days to be used simultaneously with mupirocin nasal ointment to both nares, under fingernails, axilla, groin bid x 7 days followed by weekly hibiclens bodywash

## 2021-04-16 NOTE — PROGRESS NOTES
Problem: Falls - Risk of  Goal: *Absence of Falls  Description: Document Cecily Dumont Fall Risk and appropriate interventions in the flowsheet. Outcome: Resolved/Met  Note: Fall Risk Interventions:  Mobility Interventions: Patient to call before getting OOB         Medication Interventions: Teach patient to arise slowly    Elimination Interventions: Call light in reach              Problem: Cellulitis Care Plan (Adult)  Goal: *Skin integrity maintained  Outcome: Resolved/Met  Goal: *Absence of infection signs and symptoms  Outcome: Resolved/Met     Problem: Risk for Spread of Infection  Goal: Prevent transmission of infectious organism to others  Description: Prevent the transmission of infectious organisms to other patients, staff members, and visitors.   Outcome: Resolved/Met

## 2021-04-16 NOTE — PROGRESS NOTES
[employer] delivered to patient and forms signed and place in chart. Discharge order noted for today. Pt has been accepted to [facility] agency. Met with patient and he is agreeable to the transition plan today. Transport has been arranged through patient's friend. Patient's discharge summary and home health  orders have been forwarded to [address] [place] [address] via [place]. Updated bedside RN,  to the transition plan.   Discharge information has been documented on the AVS.       Ronn Washburn RN, BSN   Care Management  [phone]

## 2021-04-16 NOTE — HOME CARE
Received home health referral for [facility] for SN, PT, OT - with wound care/wound vac. Spoke with patient, explained services and answered all questions. Demographics verified. Patient has the following DME: received home wound vac from . Per ZOE Edmonds, the assigned floor RN will change dressing and apply portable wound vac prior to discharge. Per patient, his friend Sunil Lux will be his caregiver and is willing to perform dressing changes. [facility] will follow. No discharge noted as of this entry. Patricia Pierre [address]       1939 - discharge order noted. Home health noted and arranged.    Mayo Canchola LPN   [facility] Liaison  [phone]

## 2021-04-16 NOTE — PROGRESS NOTES
[facility]  Progress Note    Patient: Gustavo Bai MRN: [id number]   SSN: [id number]  YOB: 1960   Age: 56 y.o. Sex: male      Admit Date: 4/11/2021    LOS: 5 days   Chief Complaint   Patient presents with    Skin Infection       Subjective:     No acute overnight events. Patient reports pain is well controlled on current pain regimen. Patient is ready to go home with home health. Awaiting gen. Surgery recs for repeat debridement of posterior neck cellulitis. ID recommends transitioning to Zyvox on discharge with MRSA decolonization protocol. Review of Systems   Constitutional: Negative. HENT: Negative. Eyes: Negative for blurred vision. Respiratory: Negative for cough, hemoptysis, shortness of breath and wheezing. Cardiovascular: Negative for chest pain, palpitations and leg swelling. Gastrointestinal: Negative for abdominal pain, nausea and vomiting. Musculoskeletal: Negative for neck pain. Neurological: Negative for dizziness and headaches. Objective:     Patient Vitals for the past 24 hrs:   Temp Pulse Resp BP SpO2   04/16/21 0700 97 °F (36.1 °C) 69 18 (!) 143/91 96 %   04/16/21 0642 (!) 96.5 °F (35.8 °C) 68 18 (!) 146/90 94 %   04/15/21 2021 97 °F (36.1 °C) 76 18 (!) 148/93 97 %   04/15/21 1639 98 °F (36.7 °C) 74 18 130/85 96 %   04/15/21 1115 97.2 °F (36.2 °C) 89 18 (!) 147/96 97 %     Intake and Output:  Current Shift: 04/16 0701 - 04/16 1900  In: -   Out: 1200 [Urine:1200]  Last three shifts: 04/14 1901 - 04/16 0700  In: 1560 [P.O.:1560]  Out: 2610 [Urine:2610]    Physical Exam  Constitutional:       General: He is not in acute distress. Appearance: He is normal weight. He is not ill-appearing, toxic-appearing or diaphoretic. Eyes:      General: No scleral icterus. Conjunctiva/sclera: Conjunctivae normal.   Neck:      Musculoskeletal: Normal range of motion. Muscular tenderness present.    Cardiovascular:      Rate and Rhythm: Normal rate and regular rhythm. Pulses: Normal pulses. Heart sounds: Normal heart sounds. No murmur. No friction rub. No gallop. Pulmonary:      Effort: Pulmonary effort is normal. No respiratory distress. Breath sounds: Normal breath sounds. No wheezing, rhonchi or rales. Abdominal:      General: Abdomen is flat. Bowel sounds are normal. There is no distension. Palpations: Abdomen is soft. Tenderness: There is no abdominal tenderness. There is no guarding or rebound. Musculoskeletal:         General: No swelling. Right lower leg: No edema. Left lower leg: No edema. Skin:     General: Skin is warm. Capillary Refill: Capillary refill takes less than 2 seconds. Findings: Lesion and rash present. Comments: Cellulitis at posterior neck debrided with bandage overlying.  - Right mandibular mildly swollen extended below mandible improved from previous   Neurological:      Mental Status: He is oriented to person, place, and time. Motor: No weakness.        Lab/Data Review:  Recent Results (from the past 12 hour(s))   VANCOMYCIN, TROUGH    Collection Time: 04/16/21  4:35 AM   Result Value Ref Range    Vancomycin,trough 17.8 10.0 - 20.0 ug/mL    Reported dose date [id number]      Reported dose time: 1700      Reported dose: 1.75 G UNITS   METABOLIC PANEL, BASIC    Collection Time: 04/16/21  4:40 AM   Result Value Ref Range    Sodium 138 136 - 145 mmol/L    Potassium 3.4 (L) 3.5 - 5.5 mmol/L    Chloride 106 100 - 111 mmol/L    CO2 27 21 - 32 mmol/L    Anion gap 5 3.0 - 18 mmol/L    Glucose 96 74 - 99 mg/dL    BUN 16 7.0 - 18 MG/DL    Creatinine 0.71 0.6 - 1.3 MG/DL    BUN/Creatinine ratio 23 (H) 12 - 20      GFR est AA >60 >60 ml/min/1.73m2    GFR est non-AA >60 >60 ml/min/1.73m2    Calcium 8.3 (L) 8.5 - 10.1 MG/DL   CBC WITH AUTOMATED DIFF    Collection Time: 04/16/21  4:40 AM   Result Value Ref Range    WBC 17.5 (H) 4.6 - 13.2 K/uL    RBC 4.57 4.35 - 5.65 M/uL    HGB 14.1 13.0 - 16.0 g/dL    HCT 40.5 36.0 - 48.0 %    MCV 88.6 74.0 - 97.0 FL    MCH 30.9 24.0 - 34.0 PG    MCHC 34.8 31.0 - 37.0 g/dL    RDW 13.8 11.6 - 14.5 %    PLATELET 378 878 - 151 K/uL    MPV 9.1 (L) 9.2 - 11.8 FL    NEUTROPHILS 60 40 - 73 %    BAND NEUTROPHILS 8 (H) 0 - 5 %    LYMPHOCYTES 18 (L) 21 - 52 %    MONOCYTES 8 3 - 10 %    EOSINOPHILS 2 0 - 5 %    BASOPHILS 0 0 - 2 %    METAMYELOCYTES 2 (H) 0 %    MYELOCYTES 2 (H) 0 %    ABS. NEUTROPHILS 11.9 (H) 1.8 - 8.0 K/UL    ABS. LYMPHOCYTES 3.2 0.9 - 3.6 K/UL    ABS. MONOCYTES 1.4 (H) 0.05 - 1.2 K/UL    ABS. EOSINOPHILS 0.4 0.0 - 0.4 K/UL    ABS. BASOPHILS 0.0 0.0 - 0.1 K/UL    DF MANUAL      PLATELET COMMENTS ADEQUATE PLATELETS      RBC COMMENTS NORMOCYTIC, NORMOCHROMIC         RECENT RESULTS  MODALITY IMPRESSION   XR Results from Hospital Encounter encounter on 02/05/21   XR HAND RT MIN 3 V    Narrative RIGHT HAND RADIOGRAPH-3 VIEWS    INDICATION: Skin infection. COMPARISON: None    FINDINGS:  No evidence for acute fracture or dislocation. Mild degenerative change at the  first digit interphalangeal joint. Mild joint space narrowing at the first MCP  joint first Aia 16 joint. Prominent soft tissue swelling noted at the hyperthenar  aspect of the hand. Additional mild soft tissue swelling extends along the  dorsal aspect of the hand and ulnar aspect of the wrist. No evidence for  retained radiopaque foreign body. Impression 1. No acute osseous finding. 2. Soft tissue swelling as described. CT Results from [place] encounter on 04/11/21   CT MAXILLOFACIAL W CONT    Narrative EXAM:  CT Maxillofacial Study with Contrast                         INDICATION:  Facial and neck swelling. COMPARISON:  CT neck 04/08/21            TECHNIQUE:      - Helical volumetric scan of the maxillofacial structures is performed following  IV contrast (100 mL Isovue-300).  Coronal and sagittal reformation images are  generated for improved anatomic delineation.  - Radiation dose optimization techniques are utilized as appropriate to the  exam, with combination of automated exposure control, adjustment of the mA  and/or kV according to patient's size (Including appropriate matching for  site-specific examinations), or use of iterative reconstruction technique. FINDINGS:          Soft Tissues:    - Skin thickening in the left facial and neck region with subcutaneous fat  stranding, most pronounced in the submandibular region. No distinct drainable  fluid collection is demonstrated in the imaged area. - Bilateral cervical musculature with edematous changes (axial #63-76). - Multiple shotty lymph nodes. The largest of the lymph nodes on the left side  measures up to about 0.9 x 0.9 cm (axial #68). The largest of the right sided  lymph nodes measures about 1.0 x 0.8 cm (axial #62). Bones:    - No acute facial bone fracture. - Left mandibular 1st molar tooth with a developing dental cavity with  periapical subtle lucency. Miscellaneous:  Incidentally included base of the brain appears unremarkable. Impression           1. Edematous changes involving the facial skin and subcutaneous tissue was  pronounced in the submandibular region. The source for the edematous changes is  unclear. 2.  Cervical musculature with apparent edematous changes around the margins of  the cervical musculature bilaterally. Again the source for these edematous  changes is unclear on current image. Correlation with CT of the chest may be  helpful for further delineation. MRI No results found for this or any previous visit. ULTRASOUND Results from [place] encounter on 08/08/12   US EXT NONVAS LT LTD    Impression IMPRESSION:    Soft tissue swelling. No evidence for free fluid or soft tissue mass. Cardiology Procedures/Testing:  MODALITY RESULTS   EKG No results found for this or any previous visit.     ECHO Special Testing/Procedures:  MODALITY RESULTS   MICRO All Micro Results     Procedure Component Value Units Date/Time    CULTURE, BLOOD [[id number]] Collected: 04/11/21 1630    Order Status: Completed Specimen: Blood Updated: 04/16/21 0808     Special Requests: NO SPECIAL REQUESTS        Culture result: NO GROWTH 5 DAYS       CULTURE, BLOOD [[id number]] Collected: 04/11/21 1642    Order Status: Completed Specimen: Blood Updated: 04/16/21 0808     Special Requests: NO SPECIAL REQUESTS        Culture result: NO GROWTH 5 DAYS       CULTURE, ANAEROBIC [[id number]] Collected: 04/13/21 1110    Order Status: Completed Specimen: Abscess Updated: 04/15/21 1400     Special Requests: POSTIERIOR NECK     Culture result: NO ANAEROBES ISOLATED       CULTURE, WOUND [place] Cover STAIN [[id number]]  (Abnormal)  (Susceptibility) Collected: 04/11/21 2144    Order Status: Completed Specimen: Wound from Neck Updated: 04/14/21 1109     Special Requests: NO SPECIAL REQUESTS        GRAM STAIN OCCASIONAL WBCS SEEN         3+ GRAM POSITIVE COCCI        Culture result:       HEAVY * METHICILLIN RESISTANT STAPHYLOCOCCUS AUREUS *          CULTURE, WOUND [place] Cover STAIN [[id number]]  (Abnormal)  (Susceptibility) Collected: 04/11/21 2144    Order Status: Completed Specimen: Boil Updated: 04/14/21 1107     Special Requests: R/O MRSA     GRAM STAIN OCCASIONAL WBCS SEEN         2+ GRAM POSITIVE COCCI        Culture result:       HEAVY * METHICILLIN RESISTANT STAPHYLOCOCCUS AUREUS *          CULTURE, MRSA [[id number]]  (Abnormal) Collected: 04/12/21 1142    Order Status: Completed Specimen: Nasal from Nares Updated: 04/13/21 1259     Special Requests: NO SPECIAL REQUESTS        Culture result: MRSA PRESENT               Screening of patient nares for MRSA is for surveillance purposes and, if positive, to facilitate isolation considerations in high risk settings.  It is not intended for automatic decolonization interventions per se as regimens are not sufficiently effective to warrant routine use. CULTURE, TISSUE W Marcial Siemens [[id number]] Collected: 04/13/21 1110    Order Status: Canceled Specimen: Neck     COVID-19 RAPID TEST [[id number]] Collected: 04/13/21 0915    Order Status: Completed Specimen: Nasopharyngeal Updated: 04/13/21 0945     Specimen source Nasopharyngeal        COVID-19 rapid test Not detected        Comment: Rapid Abbott ID Now       Rapid NAAT:  The specimen is NEGATIVE for SARS-CoV-2, the novel coronavirus associated with COVID-19. Negative results should be treated as presumptive and, if inconsistent with clinical signs and symptoms or necessary for patient management, should be tested with an alternative molecular assay. Negative results do not preclude SARS-CoV-2 infection and should not be used as the sole basis for patient management decisions. This test has been authorized by the FDA under an Emergency Use Authorization (EUA) for use by authorized laboratories.    Fact sheet for Healthcare Providers: [employer] Therapiesdate.co.nz  Fact sheet for Patients: Ridejoydate.co.nz       Methodology: Isothermal Nucleic Acid Amplification         CULTURE, MRSA [[id number]] Collected: 04/11/21 1800    Order Status: Canceled Specimen: Nasal          UA Results for orders placed or performed in visit on 09/18/20   AMB POC URINALYSIS DIP STICK AUTO W/O MICRO     Status: None   Result Value Ref Range Status    Color (UA POC) Yellow  Final    Clarity (UA POC) Clear  Final    Glucose (UA POC) Negative Negative Final    Bilirubin (UA POC) Negative Negative Final    Ketones (UA POC) Trace Negative Final    Specific gravity (UA POC) 1.020 1.001 - 1.035 Final    Blood (UA POC) Negative Negative Final    pH (UA POC) 6.5 4.6 - 8.0 Final    Protein (UA POC) 1+ Negative Final    Urobilinogen (UA POC) 0.2 mg/dL 0.2 - 1 Final    Nitrites (UA POC) Negative Negative Final    Leukocyte esterase (UA POC) Negative Negative Final        Telemetry NONE   Oxygen NONE     Assessment and Plan:     56 y.o. male with PMH HTN, HLD and GERD, now admitted with cellulitis of neck.     Sepsis 2/2 to cellulitis at neck: Cellulitis at back of neck with outpatient treatment failure. Patient seen in the ED and discharged home with clindamycin with no resolution of symptoms. DDx include cellulitis vs. Abscess. Recurrent cellulitis most likely secondary to Hydradenitis suppurativa. Patient has been afebrile, hypertensive to 146/94 with mild tachycardia to 103. Labs significant for leukocytosis (WBC 28.3) with left shift, lactic acid wnl. Meets 2/4 SIRS criteria (tachycardia and leukocytosis). CT Neck (4/8) showed Soft tissue swelling throughout the cutaneous and subcutaneous posterior neck (cellulitis w/o abscess), Left mandibular first molar tooth dental and periodontal disease  · CT maxillofacial with contrast (4/12): Edematous changes involving the facial skin and subcutaneous tissue was pronounced in the submandibular region. Cervical musculature with apparent edematous changes around the margins of the cervical musculature bilaterally. Source for the edematous changes is unclear. · ENT consulted, assessed patient 4/13 - non-tender mild edematous changes non-tender most likely 2/2 to significant posterior neck infection  · MRSA nares: POSITIVE  · Wound culture (4/11): heavy staph aureus, 4/13 gram + cocci   · Blood culture (4/11): NG2D  - Continue on medicine inpatient floor  - Discontinue Unasyn 3g q6h (4/12-4/15)  - Continue Vancomycin 1750mg q12h   - Pain control - tylenol and ibuprofen prn, Norco prn  - ID consulted, appreciate recs  - wound care consulted, appreciate recs  - warm compress  - Daily labs: CBC, BMP  - VSS as per protocol  - I&D (4/14) by gen.  Surgery, possible additional debridement for Friday 4/16      HTN: BP on admission 146/94  - Continue home Amlodipine 10mg qD and Losartan 10mg qD     Hyperkalemia, resolved: K+ 3.3 on admission  - replete as per protocol  - repeat BMP     HLD: Continue home Lipitor 10mg qHS and ASA 81mg qD  - ASA held for I&D     GERD: Omeprazole 20mg qD at home  - substitute with Protonix 20mg qD     Global Care:  - PT/OT   - case management   Diet NPO   DVT Prophylaxis Held Lovenox for I&D   Code status Full   Disposition  <2nights      Point of Contact Song Rhodes  Relationship:Friend  [phone]     Vani Gonzales  Relationship:Friend  [phone]       Toribio Kilgore MD, PGY1   [address]   Intern Pager: [phone]   April 16, 2021, 10:03 AM

## 2021-04-16 NOTE — WOUND CARE
Physical Exam  
Room 516: discussed with Dr. Michelle Byrnes via phone. pts neck wound is 2.5x4.7x1.5cm, yellow base, no odor. Skin prepped & shaved excess hair around pt's wound. Utilized silver granufoam cut to fit, standard 125mmHG continuous suction. Irrigation discontinued. Discussed with primary nurse Janelle ANTONIO. Education provided to pt on home care of Home [employer] wound vac when it is dispensed & applied prior to discharge. Will turn over care to nursing staff at this time.  
Lele CHRISTINEN, RN, [facility], [address]

## 2021-04-16 NOTE — PROGRESS NOTES
D/c plan is home with home health. Referral sent to [address] [facility], they are reviewing for acceptance. Home Wound Vac has been approved and CM will deliver to bedside upon discharge. Will continue to follow up.      Claudette Santa RN, BSN   Care Management  [phone]

## 2021-04-17 ENCOUNTER — HOME CARE VISIT (OUTPATIENT)
Dept: SCHEDULING | Facility: HOME HEALTH | Age: 61
End: 2021-04-17
Payer: COMMERCIAL

## 2021-04-17 VITALS
DIASTOLIC BLOOD PRESSURE: 83 MMHG | SYSTOLIC BLOOD PRESSURE: 111 MMHG | HEART RATE: 98 BPM | TEMPERATURE: 98.1 F | OXYGEN SATURATION: 97 % | RESPIRATION RATE: 18 BRPM

## 2021-04-17 LAB
BACTERIA SPEC CULT: NORMAL
BACTERIA SPEC CULT: NORMAL
SERVICE CMNT-IMP: NORMAL
SERVICE CMNT-IMP: NORMAL

## 2021-04-17 PROCEDURE — 400013 HH SOC

## 2021-04-17 PROCEDURE — G0299 HHS/HOSPICE OF RN EA 15 MIN: HCPCS

## 2021-04-18 ENCOUNTER — HOME CARE VISIT (OUTPATIENT)
Dept: HOME HEALTH SERVICES | Facility: HOME HEALTH | Age: 61
End: 2021-04-18
Payer: COMMERCIAL

## 2021-04-18 NOTE — PROGRESS NOTES
Dear Dr. Gris Sarah and Dr. Junior ,     Your patient, Barry Davies,  [birth year], declined PT evaluation reporting he was raking leaves today and does not need Home PT services. PT orders will be discontinued. Any questions please call me at [phone] or email me back.       Sincerely,     Terra Benson, PT

## 2021-04-19 ENCOUNTER — HOME CARE VISIT (OUTPATIENT)
Dept: SCHEDULING | Facility: HOME HEALTH | Age: 61
End: 2021-04-19
Payer: COMMERCIAL

## 2021-04-19 ENCOUNTER — HOME CARE VISIT (OUTPATIENT)
Dept: HOME HEALTH SERVICES | Facility: HOME HEALTH | Age: 61
End: 2021-04-19
Payer: COMMERCIAL

## 2021-04-19 PROCEDURE — G0300 HHS/HOSPICE OF LPN EA 15 MIN: HCPCS

## 2021-04-19 NOTE — DISCHARGE SUMMARY
[facility]  Discharge Summary    Patient: Zac Corrales MRN: [id number]  CSN: [id number]    YOB: 1960  Age: 56 y.o. Sex: male      Admission Date: 4/11/2021 Discharge Date: 4/16/2021   Attending: No att. providers found PCP: Andrew Barnett MD     ===================================================================    Reason for Admission: Sepsis due to cellulitis ([address] [L03.90, A41.9]  Cellulitis [L03.90]    Discharge Diagnoses:   Cellulitis    Important notes to PCP/ follow-up studies and evaluations   Patient admitted for MRSA+ cellulitis of posterior neck with debridement of necrotic tissue   - discharged home with home health for wound care with wound vac  - discharged with home with oral antibiotics to cover for MRSA infection (linezolid for 12-days)  - discharged with MRSA decolonization treatment (Hibiclens 4 percent bodywash daily x 7 days to be used simultaneously with mupirocin nasal ointment to both nares, under fingernails, axilla, groin bid x 7 days followed by weekly hibiclens bodywash) after neck wound heals  - patient to make follow-up appointment with general surgery April 26, 2021    Pending labs and studies:  none    Operative Procedures:   Debridement of necrotizing fascitis of the neck with debridement of soft tissue and drainage of fluid collections    Discharge Medications:     Discharge Medication List as of 4/16/2021  2:18 PM      START taking these medications    Details   acetaminophen (TYLENOL) 325 mg tablet Take 2 Tabs by mouth every four (4) hours as needed for Pain for up to 7 days. , Print, Disp-40 Tab, R-0      ibuprofen (MOTRIN) 600 mg tablet Take 1 Tab by mouth every six (6) hours as needed for Pain for up to 7 days. Indications: pain, Print, Disp-20 Tab, R-0      mupirocin calcium (BACTROBAN) 2 % nasal ointment by Both Nostrils route two (2) times a day for 7 days.  Apply ointment to both nares, under fingernails, axilla, groin twice daily for 7-days, Print, Disp-2 Tube, R-0      linezolid (ZYVOX) 600 mg tablet Take 1 Tab by mouth two (2) times a day for 11 days. Indications: skin infection due to Staphylococcus aureus bacteria, Print, Disp-22 Tab, R-0         CONTINUE these medications which have CHANGED    Details   HYDROcodone-acetaminophen (NORCO) 5-325 mg per tablet Take 1 Tab by mouth every four (4) hours as needed for Pain for up to 3 days. Max Daily Amount: 6 Tabs., Print, Disp-18 Tab, R-0      chlorhexidine (Hibiclens) 4 % liquid Apply 236 mL to affected area every seven (7) days for 30 days. Use body wash once weekly after initial first 7-days of daily use, Print, Disp-944 mL, R-0      chlorhexidine (Hibiclens) 4 % liquid Apply 236 mL to affected area daily for 7 days. Use body wash daily for 7-days, Print, Disp-1652 mL, R-0            CONTINUE these medications which have NOT CHANGED    Details   omega 3-dha-epa-fish oil (Fish Oil) 100-160-1,000 mg cap Take  by mouth., Historical Med      aspirin delayed-release 81 mg tablet Take  by mouth daily. , Historical Med      omeprazole (PRILOSEC) 20 mg capsule Take  by mouth., Historical Med      atorvastatin (LIPITOR) 40 mg tablet Take  by mouth., Historical Med      losartan (COZAAR) 100 mg tablet Take 100 mg by mouth daily. Historical Med, 100 mg      amLODIPine (NORVASC) 5 mg tablet Take 10 mg by mouth daily. , Historical Med         STOP taking these medications       clindamycin (CLEOCIN) 300 mg capsule Comments:   Reason for Stopping:               Disposition: Home with Home Health    Consultants:    Infectious Disease, General Surgery, Wound care    Brief Hospital Course (including pertinent history and physical findings)  Reason for admission: Cellulitis    56 y.o. male with PMH HTN, HLD and GERD, now admitted with cellulitis of neck.     Cellulitis of posterior neck: Cellulitis at posterior neck with outpatient treatment failure.  Patient seen in the ED and discharged home on clindamycin with no resolution of symptoms. DDx include cellulitis vs. Abscess.  Recurrent cellulitis most likely secondary to Hydradenitis suppurativa. Met 2/4 SIRS criteria (tachycardia and leukocytosis) at [facility], however, patient hemodynamically stable and was not septic. Patient has been afebrile, hypertensive to 146/94 with mild tachycardia to 103. Labs significant for leukocytosis (WBC 28.3) with left shift, lactic acid wnl. CT Neck (4/8) showed Soft tissue swelling throughout the cutaneous and subcutaneous posterior neck (cellulitis w/o abscess), Left mandibular first molar tooth dental and periodontal disease. CT maxillofacial with contrast (4/12) showed edematous changes involving the facial skin and subcutaneous tissue was pronounced in the submandibular region and cervical musculature with apparent edematous changes around the margins of the cervical musculature bilaterally. Wound cultures showed heavy staph aureus and MRSA positive in nares; blood cultures were negative. Infectious disease, general surgery and ENT consulted. Patient treated with IV Unasyn (4/12-4/15) and vancomycin (4/11-4/16) while inpatient and discharged home on linezolid for an additional 12-days. ENT assessed patient for swelling around neck and mandible and concluded that edematous changes most likely secondary to posterior neck infection. Patient underwent debridement of necrotizing fascitis of the neck with debridement of soft tissue and drainage of fluid collections. A wound vac was applied to open wound in posterior neck and patient discharged home with wound vac and home health orders. Patient to make a follow-up appointment outpatient with general surgery Apri 26, 2021.  After neck wound heals, patient should undergo a MRSA decolonization treatment (Hibiclens 4 percent bodywash daily x 7 days to be used simultaneously with mupirocin nasal ointment to both nares, under fingernails, axilla, groin bid x 7 days followed by weekly hibiclens bodywash).     Other concerns addressed during this admission:  HTN: Blood pressure stable on  home Amlodipine 10mg qD and Losartan 10mg qD     Hyperkalemia, resolved: K+ 3.3 on admission and repleted as per protocol.     HLD: Continued home Lipitor 10mg qHS and ASA 81mg qD     GERD: Protonix 20mg qD substituted for home Omeprazole 20mg qD at home    [address] [address] Maxillofacial W Cont    Result Date: 4/12/2021          1. Edematous changes involving the facial skin and subcutaneous tissue was pronounced in the submandibular region. The source for the edematous changes is unclear. 2.  Cervical musculature with apparent edematous changes around the margins of the cervical musculature bilaterally. Again the source for these edematous changes is unclear on current image. Correlation with CT of the chest may be helpful for further delineation. Cardiology Procedures/Testing:  MODALITY RESULTS   EKG No results found for this or any previous visit. ECHO     Nuclear Medicine No results found for this or any previous visit. IR No results found for this or any previous visit.    CATH       Special Testing/Procedures:  MODALITY RESULTS   MICRO All Micro Results     Procedure Component Value Units Date/Time    CULTURE, BLOOD [[id number]] Collected: 04/11/21 1642    Order Status: Completed Specimen: Blood Updated: 04/17/21 0713     Special Requests: NO SPECIAL REQUESTS        Culture result: NO GROWTH 6 DAYS       CULTURE, BLOOD [[id number]] Collected: 04/11/21 1630    Order Status: Completed Specimen: Blood Updated: 04/17/21 0713     Special Requests: NO SPECIAL REQUESTS        Culture result: NO GROWTH 6 DAYS       CULTURE, ANAEROBIC [[id number]] Collected: 04/13/21 1110    Order Status: Completed Specimen: Abscess Updated: 04/15/21 1400     Special Requests: POSTIERIOR NECK     Culture result: NO ANAEROBES ISOLATED       CULTURE, WOUND Sreekanth Necessary STAIN [[id number]]  (Abnormal) (Susceptibility) Collected: 04/11/21 2144    Order Status: Completed Specimen: Wound from Neck Updated: 04/14/21 1109     Special Requests: NO SPECIAL REQUESTS        GRAM STAIN OCCASIONAL WBCS SEEN         3+ GRAM POSITIVE COCCI        Culture result:       HEAVY * METHICILLIN RESISTANT STAPHYLOCOCCUS AUREUS *          CULTURE, WOUND Wesley Gale STAIN [[id number]]  (Abnormal)  (Susceptibility) Collected: 04/11/21 2144    Order Status: Completed Specimen: Boil Updated: 04/14/21 1107     Special Requests: R/O MRSA     GRAM STAIN OCCASIONAL WBCS SEEN         2+ GRAM POSITIVE COCCI        Culture result:       HEAVY * METHICILLIN RESISTANT STAPHYLOCOCCUS AUREUS *          CULTURE, MRSA [[id number]]  (Abnormal) Collected: 04/12/21 1142    Order Status: Completed Specimen: Nasal from Nares Updated: 04/13/21 1259     Special Requests: NO SPECIAL REQUESTS        Culture result: MRSA PRESENT               Screening of patient nares for MRSA is for surveillance purposes and, if positive, to facilitate isolation considerations in high risk settings. It is not intended for automatic decolonization interventions per se as regimens are not sufficiently effective to warrant routine use. CULTURE, TISSUE [address] [[id number]] Collected: 04/13/21 1110    Order Status: Canceled Specimen: Neck     COVID-19 RAPID TEST [[id number]] Collected: 04/13/21 0915    Order Status: Completed Specimen: Nasopharyngeal Updated: 04/13/21 0945     Specimen source Nasopharyngeal        COVID-19 rapid test Not detected        Comment: Rapid Abbott ID Now       Rapid NAAT:  The specimen is NEGATIVE for SARS-CoV-2, the novel coronavirus associated with COVID-19. Negative results should be treated as presumptive and, if inconsistent with clinical signs and symptoms or necessary for patient management, should be tested with an alternative molecular assay.   Negative results do not preclude SARS-CoV-2 infection and should not be used as the sole basis for patient management decisions. This test has been authorized by the FDA under an Emergency Use Authorization (EUA) for use by authorized laboratories.    Fact sheet for Healthcare Providers: ConventionUpdate.co.nz  Fact sheet for Patients: ConventionUpdate.co.nz       Methodology: Isothermal Nucleic Acid Amplification         CULTURE, MRSA [[id number]] Collected: 04/11/21 1800    Order Status: Canceled Specimen: Nasal          ABG No results found for: PH, PHI, PCO2, PCO2I, PO2, PO2I, HCO3, HCO3I, FIO2, FIO2I   UA Results for orders placed or performed in visit on 09/18/20   AMB POC URINALYSIS DIP STICK AUTO W/O MICRO     Status: None   Result Value Ref Range Status    Color (UA POC) Yellow  Final    Clarity (UA POC) Clear  Final    Glucose (UA POC) Negative Negative Final    Bilirubin (UA POC) Negative Negative Final    Ketones (UA POC) Trace Negative Final    Specific gravity (UA POC) 1.020 1.001 - 1.035 Final    Blood (UA POC) Negative Negative Final    pH (UA POC) 6.5 4.6 - 8.0 Final    Protein (UA POC) 1+ Negative Final    Urobilinogen (UA POC) 0.2 mg/dL 0.2 - 1 Final    Nitrites (UA POC) Negative Negative Final    Leukocyte esterase (UA POC) Negative Negative Final      ENDO [unfilled]   [unfilled]      Laboratory Results:  LABORATORY RESULTS   HEMATOLOGY Lab Results   Component Value Date/Time    WBC 17.5 (H) 04/16/2021 04:40 AM    HGB 14.1 04/16/2021 04:40 AM    HCT 40.5 04/16/2021 04:40 AM    PLATELET 027 48/51/9500 04:40 AM    MCV 88.6 04/16/2021 04:40 AM       CHEMISTRIES Lab Results   Component Value Date/Time    Sodium 138 04/16/2021 04:40 AM    Potassium 3.4 (L) 04/16/2021 04:40 AM    Chloride 106 04/16/2021 04:40 AM    CO2 27 04/16/2021 04:40 AM    Anion gap 5 04/16/2021 04:40 AM    Glucose 96 04/16/2021 04:40 AM    BUN 16 04/16/2021 04:40 AM    Creatinine 0.71 04/16/2021 04:40 AM    BUN/Creatinine ratio 23 (H) 04/16/2021 04:40 AM    GFR est AA >60 04/16/2021 04:40 AM    GFR est non-AA >60 04/16/2021 04:40 AM    Calcium 8.3 (L) 04/16/2021 04:40 AM      HEPATIC FUNCTION Lab Results   Component Value Date/Time    Albumin 3.4 04/08/2021 07:44 PM    Bilirubin, total 0.6 04/08/2021 07:44 PM    Protein, total 7.7 04/08/2021 07:44 PM    Globulin 4.3 (H) 04/08/2021 07:44 PM    A-G Ratio 0.8 04/08/2021 07:44 PM    ALT (SGPT) 35 04/08/2021 07:44 PM    Alk. phosphatase 76 04/08/2021 07:44 PM       LACTIC ACID No results found for: LAC   CARDIAC PANEL No results found for: CPK, RCK1, RCK2, RCK3, RCK4, CKMB, CKNDX, CKND1, TROPT, TROIQ, BNPP, BNP   NT-proBNP No results found for: BNP, BNPP, BNPPPOC, XBNPT, BNPNT   THYROID Lab Results   Component Value Date/Time    TSH 2.96 04/08/2010 08:57 AM      LIPID PANEL Lab Results   Component Value Date/Time    Cholesterol, total 155 01/15/2010 05:08 PM    HDL Cholesterol 28 (L) 10/22/2010 10:16 AM    LDL,Direct 113 (H) 10/22/2010 10:16 AM    LDL, calculated 94.4 01/15/2010 05:08 PM    VLDL, calculated 32.6 01/15/2010 05:08 PM    Triglyceride 163 (H) 01/15/2010 05:08 PM    CHOL/HDL Ratio 5.5 (H) 01/15/2010 05:08 PM         RISK CALCULATORS:  SCORE RESULT   ASCVD The ASCVD Risk score (Debbie Perla, et al., 2013) failed to calculate for the following reasons:    Cannot find a previous HDL lab    Cannot find a previous total cholesterol lab    [id number]     HAS-BLED    READMISSION RISK SCORE Low Risk            3       Total Score        3 Patient Length of Stay (>5 days = 3)        Criteria that do not apply:    Has Seen PCP in Last 6 Months (Yes=3, No=0)    . Living with Significant Other. Assisted Living. LTAC. SNF. or   Rehab    IP Visits Last 12 Months (1-3=4, 4=9, >4=11)    Pt.  Coverage (Medicare=5 , Medicaid, or Self-Pay=4)    Charlson Comorbidity Score (Age + Comorbid Conditions)           Functional status and cognitive function:    Ambulates with: independently  Status: alert, cooperative, no distress, appears stated age  Condition: STABLE  Disposition: Home with home health    Diet: General Diet    Code status and advanced care plan: Full    Point of Contact Song Rhodes  Relationship:Friend  [phone]     Gena Castaneda  Relationship:Friend  [phone]     Patient Education:  Patient was educated on the following topics prior to discharge:  - Cellulitis  - MRSA     Follow-up:   Follow-up Information     Follow up With Specialties Details Why Contact Info    Lori Bustamante MD Family Medicine Schedule an appointment as soon as possible for a visit in 1 week  [address]      Rosanne Prince MD Surgery Schedule an appointment as soon as possible for a visit on 4/26/2021  [address] [address]  [phone]            =================================================================  Hollie Lomeli MD, PGY-1   [address]   Intern Pager: [phone]   April 18, 2021

## 2021-04-21 ENCOUNTER — HOME CARE VISIT (OUTPATIENT)
Dept: HOME HEALTH SERVICES | Facility: HOME HEALTH | Age: 61
End: 2021-04-21
Payer: COMMERCIAL

## 2021-04-21 ENCOUNTER — HOME CARE VISIT (OUTPATIENT)
Dept: SCHEDULING | Facility: HOME HEALTH | Age: 61
End: 2021-04-21
Payer: COMMERCIAL

## 2021-04-21 PROCEDURE — G0300 HHS/HOSPICE OF LPN EA 15 MIN: HCPCS

## 2021-04-23 ENCOUNTER — HOME CARE VISIT (OUTPATIENT)
Dept: SCHEDULING | Facility: HOME HEALTH | Age: 61
End: 2021-04-23
Payer: COMMERCIAL

## 2021-04-23 PROCEDURE — A5120 SKIN BARRIER, WIPE OR SWAB: HCPCS

## 2021-04-23 PROCEDURE — A6250 SKIN SEAL PROTECT MOISTURIZR: HCPCS

## 2021-04-23 PROCEDURE — A6216 NON-STERILE GAUZE<=16 SQ IN: HCPCS

## 2021-04-23 PROCEDURE — A4369 SKIN BARRIER LIQUID PER OZ: HCPCS

## 2021-04-23 PROCEDURE — G0300 HHS/HOSPICE OF LPN EA 15 MIN: HCPCS

## 2021-04-26 ENCOUNTER — OFFICE VISIT (OUTPATIENT)
Dept: SURGERY | Age: 61
End: 2021-04-26
Payer: COMMERCIAL

## 2021-04-26 ENCOUNTER — HOME CARE VISIT (OUTPATIENT)
Dept: HOME HEALTH SERVICES | Facility: HOME HEALTH | Age: 61
End: 2021-04-26
Payer: COMMERCIAL

## 2021-04-26 VITALS
SYSTOLIC BLOOD PRESSURE: 133 MMHG | DIASTOLIC BLOOD PRESSURE: 71 MMHG | OXYGEN SATURATION: 98 % | RESPIRATION RATE: 16 BRPM | TEMPERATURE: 97.7 F | TEMPERATURE: 97.7 F | SYSTOLIC BLOOD PRESSURE: 130 MMHG | HEART RATE: 60 BPM | OXYGEN SATURATION: 99 % | DIASTOLIC BLOOD PRESSURE: 71 MMHG | HEART RATE: 60 BPM | RESPIRATION RATE: 18 BRPM

## 2021-04-26 VITALS
HEIGHT: 67 IN | HEART RATE: 83 BPM | BODY MASS INDEX: 28.28 KG/M2 | OXYGEN SATURATION: 97 % | DIASTOLIC BLOOD PRESSURE: 88 MMHG | TEMPERATURE: 97.7 F | SYSTOLIC BLOOD PRESSURE: 138 MMHG | WEIGHT: 180.2 LBS

## 2021-04-26 VITALS
TEMPERATURE: 97.7 F | DIASTOLIC BLOOD PRESSURE: 98 MMHG | RESPIRATION RATE: 18 BRPM | HEART RATE: 64 BPM | OXYGEN SATURATION: 98 % | SYSTOLIC BLOOD PRESSURE: 141 MMHG

## 2021-04-26 DIAGNOSIS — Z09 POSTOPERATIVE EXAMINATION: Primary | ICD-10-CM

## 2021-04-26 PROCEDURE — 99024 POSTOP FOLLOW-UP VISIT: CPT | Performed by: SURGERY

## 2021-04-26 NOTE — PROGRESS NOTES
Patient seen and examined. He is doing well and he feels great. 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 I stopped the wound VAC and took the suction off and remove the dressing and that the wound is currently way better than before. The open part of the wound is about 6 inch by half an inch oval and it is only deep to the subcutaneous tissue with some whitish dried that tissue but it is healing great. I think it is okay to stop the wound VAC and just do a gauze placement. He is going to be seen again by the wound care nurse at this week and will see their opinion but the patient stating that he most likely will do it himself.   Follow-up with me as needed

## 2021-04-26 NOTE — PROGRESS NOTES
Gumaro Carrasco is a 56 y.o. male (: 1960) presenting to address:    Chief Complaint   Patient presents with    Surgical Follow-up     Debridement od necrotizing fascitia of the neck with debridementrmsoft tissue and drainage of fluid collctions 21       Medication list and allergies have been reviewed with Gumaro Carrasco and updated as of today's date. I have gone over all Medical, Surgical and Social History with Gumaro Carrasco and updated/added the information accordingly. 1. Have you been to the ER, Urgent Care or Hospitalized since your last visit? NO      2. Have you followed up with your PCP or any other Physicians since your procedure/ last office visit?    NO

## 2021-04-27 ENCOUNTER — HOME CARE VISIT (OUTPATIENT)
Dept: HOME HEALTH SERVICES | Facility: HOME HEALTH | Age: 61
End: 2021-04-27
Payer: COMMERCIAL

## 2021-05-01 ENCOUNTER — HOME CARE VISIT (OUTPATIENT)
Dept: SCHEDULING | Facility: HOME HEALTH | Age: 61
End: 2021-05-01
Payer: COMMERCIAL

## 2021-05-01 VITALS
TEMPERATURE: 97.9 F | DIASTOLIC BLOOD PRESSURE: 98 MMHG | HEART RATE: 85 BPM | OXYGEN SATURATION: 97 % | RESPIRATION RATE: 18 BRPM | SYSTOLIC BLOOD PRESSURE: 143 MMHG

## 2021-05-01 PROCEDURE — G0299 HHS/HOSPICE OF RN EA 15 MIN: HCPCS

## (undated) DEVICE — KIT CLN UP BON SECOURS MARYV

## (undated) DEVICE — THREE-QUARTER SHEET: Brand: CONVERTORS

## (undated) DEVICE — FLEX ADVANTAGE 3000CC: Brand: FLEX ADVANTAGE

## (undated) DEVICE — SHEET, T, LAPAROTOMY, STERILE: Brand: MEDLINE

## (undated) DEVICE — PACK PROCEDURE SURG MAJ W/ BASIN LF

## (undated) DEVICE — SOLUTION IV 1000ML 0.9% SOD CHL

## (undated) DEVICE — STERILE POLYISOPRENE POWDER-FREE SURGICAL GLOVES: Brand: PROTEXIS

## (undated) DEVICE — INTENDED FOR TISSUE SEPARATION, AND OTHER PROCEDURES THAT REQUIRE A SHARP SURGICAL BLADE TO PUNCTURE OR CUT.: Brand: BARD-PARKER SAFETY BLADES SIZE 10, STERILE

## (undated) DEVICE — GAUZE,SPONGE,4"X4",16PLY,STRL,LF,10/TRAY: Brand: MEDLINE

## (undated) DEVICE — DRAPE TOWEL: Brand: CONVERTORS

## (undated) DEVICE — BLANKET WRM AD W50XL85.8IN PACU FULL BODY FORC AIR

## (undated) DEVICE — PAD,ABDOMINAL,8"X10",ST,LF: Brand: MEDLINE

## (undated) DEVICE — GARMENT,MEDLINE,DVT,SEQUENTIAL,CALF,MD: Brand: MEDLINE

## (undated) DEVICE — CULTURETTE SGL EVAC TUBE PALL -- 100/CA

## (undated) DEVICE — REM POLYHESIVE ADULT PATIENT RETURN ELECTRODE: Brand: VALLEYLAB